# Patient Record
Sex: MALE | Race: WHITE | NOT HISPANIC OR LATINO | Employment: FULL TIME | URBAN - METROPOLITAN AREA
[De-identification: names, ages, dates, MRNs, and addresses within clinical notes are randomized per-mention and may not be internally consistent; named-entity substitution may affect disease eponyms.]

---

## 2018-02-08 ENCOUNTER — TRANSFERRED RECORDS (OUTPATIENT)
Dept: HEALTH INFORMATION MANAGEMENT | Facility: CLINIC | Age: 28
End: 2018-02-08

## 2018-07-13 ENCOUNTER — TRANSFERRED RECORDS (OUTPATIENT)
Dept: HEALTH INFORMATION MANAGEMENT | Facility: CLINIC | Age: 28
End: 2018-07-13

## 2018-08-08 ENCOUNTER — TRANSFERRED RECORDS (OUTPATIENT)
Dept: HEALTH INFORMATION MANAGEMENT | Facility: CLINIC | Age: 28
End: 2018-08-08

## 2019-04-23 ENCOUNTER — TRANSFERRED RECORDS (OUTPATIENT)
Dept: HEALTH INFORMATION MANAGEMENT | Facility: CLINIC | Age: 29
End: 2019-04-23

## 2019-05-22 ENCOUNTER — TRANSFERRED RECORDS (OUTPATIENT)
Dept: HEALTH INFORMATION MANAGEMENT | Facility: CLINIC | Age: 29
End: 2019-05-22

## 2019-07-29 ENCOUNTER — TRANSFERRED RECORDS (OUTPATIENT)
Dept: HEALTH INFORMATION MANAGEMENT | Facility: CLINIC | Age: 29
End: 2019-07-29

## 2019-08-21 ENCOUNTER — TRANSFERRED RECORDS (OUTPATIENT)
Dept: HEALTH INFORMATION MANAGEMENT | Facility: CLINIC | Age: 29
End: 2019-08-21

## 2019-09-09 ENCOUNTER — PATIENT OUTREACH (OUTPATIENT)
Dept: GASTROENTEROLOGY | Facility: CLINIC | Age: 29
End: 2019-09-09

## 2019-09-12 ENCOUNTER — PATIENT OUTREACH (OUTPATIENT)
Dept: GASTROENTEROLOGY | Facility: CLINIC | Age: 29
End: 2019-09-12

## 2019-09-12 NOTE — PROGRESS NOTES
Pt returned call and scheduled with Dr. Kp Aguilar on Ocotober 15 at 340 pm. Pt aware of the date and time to check in. Need records from [place].

## 2019-09-13 NOTE — PROGRESS NOTES
Pt was rescheduled with Dr. Riggs. Pt referred to Dr. Riggs by Dr. Tamika Finn. October 25 at 8 am. Pt in agreement.

## 2019-10-23 ENCOUNTER — TELEPHONE (OUTPATIENT)
Dept: GASTROENTEROLOGY | Facility: CLINIC | Age: 29
End: 2019-10-23

## 2019-10-23 NOTE — TELEPHONE ENCOUNTER
RECORDS RECEIVED FROM: [facility]    DATE RECEIVED: 10/25/19    NOTES STATUS DETAILS   OFFICE NOTE from referring provider Received [facility] recs scanned in [facility]    OFFICE NOTE from other specialist Received    DISCHARGE SUMMARY from hospital N/A    OPERATIVE REPORT N/A    MEDICATION LIST N/A         ENDOSCOPY  N/A    COLONOSCOPY Received  2/8/19   ERCP N/A    EUS N/A    STOOL TESTING N/A    PERTINENT LABS Received Scanned in [facility]    PATHOLOGY REPORTS (RELATED) Received BX slides received from [facility], will send to path Dept - 10/25   IMAGING (CT, MRI, EGD) In process Fax to [facility] to obtain images - 10/23     REFERRAL INFORMATION    Date referral was placed: 10/25/19   Date all records received: N/A   Date records were scanned into Epic: N/A   Date records were sent to Provider to review: N/A   Date and recommendation received from provider:  LETTER SENT  SCHEDULE APPOINTMENT   Date patient was contacted to schedule: 9/12/19

## 2019-10-23 NOTE — TELEPHONE ENCOUNTER
Called and left message for patient reminding of appointment scheduled on 10/25/19 at 0800 with [facility]. Patient to arrive 15 min early. To reschedule or cancel patient to call [phone].    LEXIS Guzman

## 2019-10-25 ENCOUNTER — PRE VISIT (OUTPATIENT)
Dept: GASTROENTEROLOGY | Facility: CLINIC | Age: 29
End: 2019-10-25

## 2019-10-25 ENCOUNTER — OFFICE VISIT (OUTPATIENT)
Dept: GASTROENTEROLOGY | Facility: CLINIC | Age: 29
End: 2019-10-25
Payer: COMMERCIAL

## 2019-10-25 VITALS
SYSTOLIC BLOOD PRESSURE: 145 MMHG | HEIGHT: 74 IN | DIASTOLIC BLOOD PRESSURE: 100 MMHG | TEMPERATURE: 97.9 F | OXYGEN SATURATION: 99 % | HEART RATE: 92 BPM | BODY MASS INDEX: 28.85 KG/M2 | WEIGHT: 224.8 LBS

## 2019-10-25 DIAGNOSIS — K51.211 CHRONIC ULCERATIVE PROCTITIS WITH RECTAL BLEEDING (H): ICD-10-CM

## 2019-10-25 DIAGNOSIS — Z23 NEED FOR PROPHYLACTIC VACCINATION AND INOCULATION AGAINST INFLUENZA: Primary | ICD-10-CM

## 2019-10-25 LAB
ALBUMIN SERPL-MCNC: 4.3 G/DL (ref 3.4–5)
ALP SERPL-CCNC: 84 U/L (ref 40–150)
ALT SERPL W P-5'-P-CCNC: 30 U/L (ref 0–70)
AST SERPL W P-5'-P-CCNC: 25 U/L (ref 0–45)
BASOPHILS # BLD AUTO: 0 10E9/L (ref 0–0.2)
BASOPHILS NFR BLD AUTO: 0.6 %
BILIRUB DIRECT SERPL-MCNC: 0.2 MG/DL (ref 0–0.2)
BILIRUB SERPL-MCNC: 0.5 MG/DL (ref 0.2–1.3)
CRP SERPL-MCNC: 3 MG/L (ref 0–8)
DIFFERENTIAL METHOD BLD: ABNORMAL
EOSINOPHIL # BLD AUTO: 0.1 10E9/L (ref 0–0.7)
EOSINOPHIL NFR BLD AUTO: 0.8 %
ERYTHROCYTE [DISTWIDTH] IN BLOOD BY AUTOMATED COUNT: 17.8 % (ref 10–15)
ERYTHROCYTE [SEDIMENTATION RATE] IN BLOOD BY WESTERGREN METHOD: 9 MM/H (ref 0–15)
FERRITIN SERPL-MCNC: 7 NG/ML (ref 26–388)
HCT VFR BLD AUTO: 43.7 % (ref 40–53)
HGB BLD-MCNC: 12.9 G/DL (ref 13.3–17.7)
IMM GRANULOCYTES # BLD: 0 10E9/L (ref 0–0.4)
IMM GRANULOCYTES NFR BLD: 0.1 %
IRON SATN MFR SERPL: 6 % (ref 15–46)
IRON SERPL-MCNC: 29 UG/DL (ref 35–180)
LYMPHOCYTES # BLD AUTO: 1.6 10E9/L (ref 0.8–5.3)
LYMPHOCYTES NFR BLD AUTO: 22.6 %
MCH RBC QN AUTO: 22.4 PG (ref 26.5–33)
MCHC RBC AUTO-ENTMCNC: 29.5 G/DL (ref 31.5–36.5)
MCV RBC AUTO: 76 FL (ref 78–100)
MONOCYTES # BLD AUTO: 0.7 10E9/L (ref 0–1.3)
MONOCYTES NFR BLD AUTO: 9.2 %
NEUTROPHILS # BLD AUTO: 4.7 10E9/L (ref 1.6–8.3)
NEUTROPHILS NFR BLD AUTO: 66.7 %
NRBC # BLD AUTO: 0 10*3/UL
NRBC BLD AUTO-RTO: 0 /100
PLATELET # BLD AUTO: 244 10E9/L (ref 150–450)
PROT SERPL-MCNC: 8.9 G/DL (ref 6.8–8.8)
RBC # BLD AUTO: 5.75 10E12/L (ref 4.4–5.9)
TIBC SERPL-MCNC: 476 UG/DL (ref 240–430)
WBC # BLD AUTO: 7.1 10E9/L (ref 4–11)

## 2019-10-25 RX ORDER — MESALAMINE 1.2 G/1
2.4 TABLET, DELAYED RELEASE ORAL
COMMUNITY
End: 2020-02-04

## 2019-10-25 RX ORDER — BUDESONIDE 28 MG/1
AEROSOL, FOAM RECTAL
Qty: 4 EACH | Refills: 2 | Status: SHIPPED | OUTPATIENT
Start: 2019-10-25 | End: 2019-12-06

## 2019-10-25 ASSESSMENT — ENCOUNTER SYMPTOMS
SINUS CONGESTION: 0
DEPRESSION: 1
FEVER: 0
ARTHRALGIAS: 1
CONSTIPATION: 0
DECREASED CONCENTRATION: 0
JOINT SWELLING: 0
BOWEL INCONTINENCE: 0
HEARTBURN: 1
NECK MASS: 0
ALTERED TEMPERATURE REGULATION: 0
MYALGIAS: 0
CHILLS: 0
RECTAL PAIN: 0
WEIGHT GAIN: 0
INCREASED ENERGY: 0
HOARSE VOICE: 0
PANIC: 0
JAUNDICE: 0
DIARRHEA: 1
NECK PAIN: 0
TROUBLE SWALLOWING: 0
HALLUCINATIONS: 0
BLOOD IN STOOL: 1
POLYPHAGIA: 0
MUSCLE WEAKNESS: 0
STIFFNESS: 0
SMELL DISTURBANCE: 0
SINUS PAIN: 0
NAUSEA: 0
SORE THROAT: 0
ABDOMINAL PAIN: 1
TASTE DISTURBANCE: 0
BLOATING: 0
VOMITING: 0
NERVOUS/ANXIOUS: 0
INSOMNIA: 1
POLYDIPSIA: 0
FATIGUE: 0
DECREASED APPETITE: 0
WEIGHT LOSS: 0
NIGHT SWEATS: 0
MUSCLE CRAMPS: 0
BACK PAIN: 0

## 2019-10-25 ASSESSMENT — MIFFLIN-ST. JEOR: SCORE: 2054.44

## 2019-10-25 ASSESSMENT — PATIENT HEALTH QUESTIONNAIRE - PHQ9
SUM OF ALL RESPONSES TO PHQ QUESTIONS 1-9: 6
SUM OF ALL RESPONSES TO PHQ QUESTIONS 1-9: 6
10. IF YOU CHECKED OFF ANY PROBLEMS, HOW DIFFICULT HAVE THESE PROBLEMS MADE IT FOR YOU TO DO YOUR WORK, TAKE CARE OF THINGS AT HOME, OR GET ALONG WITH OTHER PEOPLE: NOT DIFFICULT AT ALL

## 2019-10-25 ASSESSMENT — PAIN SCALES - GENERAL: PAINLEVEL: NO PAIN (0)

## 2019-10-25 NOTE — NURSING NOTE
"Chief Complaint   Patient presents with     New Patient     new IBD consult       Vitals:    10/25/19 0758   BP: (!) 145/100   Pulse: 92   Temp: 97.9  F (36.6  C)   TempSrc: Oral   SpO2: 99%   Weight: 102 kg (224 lb 12.8 oz)   Height: 1.88 m (6' 2\")       Body mass index is 28.86 kg/m .    Kristan Riley CMA    "

## 2019-10-25 NOTE — NURSING NOTE
Printed after visit summary given to pt along with follow up appt.  Handouts on ulcerative colitis and entyvio given to pt. Pt sent to the lab. Will check in with pt in 2 weeks.

## 2019-10-25 NOTE — PROGRESS NOTES
IBD CLINIC VISIT     CC/REFERRING MD:  Self   REASON FOR CONSULTATION: Ulcerative colitis    ASSESSMENT/PLAN  29 year old with ulcerative proctitis    1.  Ulcerative proctitis: Patient with proctitis seen in 2018 on colonoscopy and again on flex sig in April 2019.  Currently minimally symptomatic on mesalamine although increase in bleeding is concerning.  Recommend steroid foam for 2 weeks.  If ongoing symptoms will need flex sig to restage disease.  If he had severe proctitis despite mesalamine and rectal steroids would opt for more definitive therapy such as vedolizumab.  Would proceed with vedolizumab over anti-TNF based on VARSITY clinical trial.  -- Steroid foam for 2 weeks, if better wean off and repeat as needed  -- If ongoing symptoms despite steroids, or rapid recurrence, will need flex will sigmoidoscopy to reevaluate disease severity  -- Information given on vedolizumab another ulcerative colitis therapies today  -- Blood work today to monitor anemia and inflammatory markers  -- He is due for a flu shot today (administered in clinic)      IBD HISTORY  Age at diagnosis: 27 (feb 2018)  Extent of disease: Proctitis   Current UC medications:   - Lialda 4.8g per day  Prior UC surgeries: None  Prior IBD Medications:  Prednisone x2 (once was IV steroids in hospital)  Canasa - no benefit  Rowasa - no benefit  Hydrocortisone enema -     DRUG MONITORING  TPMT enzyme activity:     6-TGN/6-MMPN levels:    Biologic concentration:    DISEASE ASSESSMENT  Labs:  No lab results found.    Invalid input(s):  ALB,  HGB  Endoscopic assessment: --  Enterography: --  Fecal calprotectin: --  C diff: --    sIBDQ:  No flowsheet data found.     IBD Health Care Maintenance:    Vaccinations:  All patients on biologics should avoid live vaccines.    -- Influenza (every year)  -- TdaP (every 10 years)  -- Pneumococcal Pneumonia (once plus booster at 5 years)  -- Yearly assessment for latent Tb (verbal screening and exam, PPD or  QuantiFERON-Tb testing)    One time confirmation of immunity or serologies:  -- Hepatitis A (serologies or immunizations)  -- Hepatitis B (serologies or immunizations)  -- Varicella  -- MMR  -- HPV (all aged 18-26)  -- Meningococcal meningitis (all patients at risk for meningitis)    Bone mineral density screening   -- Recommend all patients supplement with calcium and vitamin D    Cancer Screening:  Colon cancer screening:  Recommend colonoscopy in 2026 to assess histologic extent of disease    Skin cancer screening: Annual visual exam of skin by dermatologist since patient is immunocompromised    Depression Screening:  PHQ-2 Score:     PHQ-2 ( 1999 Pfizer) 10/25/2019   Q1: Little interest or pleasure in doing things 1   Q2: Feeling down, depressed or hopeless 2   PHQ-2 Score 3   Q1: Little interest or pleasure in doing things Several days   Q2: Feeling down, depressed or hopeless More than half the days   PHQ-2 Score 3      Misc:  -- Avoid tobacco use  -- Avoid NSAIDs as there is potentially a 25% chance of causing an IBD flare    Return to clinic in 3 months    Thank you for this consultation.  It was a pleasure to participate in the care of this patient; please contact us with any further questions.     This note was created with voice recognition software, and while reviewed for accuracy, typos may remain.     Charles Riggs MD   of Medicine  Inflammatory Bowel Disease Program   Division of Gastroenterology, Hepatology and Nutrition  [facility]          HPI:   Is a 29-year-old male who comes in today to establish care at the plantar bowel disease program at the [facility].  He was initially diagnosed with ulcerative proctitis in 2018.  He was given Canasa and hydrocortisone enemas, he does not think either of those helped.  He has had at least 2 steroid courses including one hospitalization with IV steroids.  He was then underwent a flex will sigmoidoscopy in April 2019  "and restarted on Canasa and Rowasa.  He reports taking both knees at the same time it was very uncomfortable and caused a lot of pain.  He does not feel like it helped with his symptoms.  He was put on Lialda 4.8 g a day and does feel that that helped somewhat.  He has had a pretty good summer.  However he is having an increase in his rectal bleeding over the past few weeks.  He notes that his diet has been worse with football season, he is eating more greasy foods.  He is also under a little more stress.    Currently, having some increase in rectal bleeding.  Having 2-3 stools a day (baseline). Stools are formed. Seeing blood on outside and in toilet.      Reports diet has not been good - over eating sugary foods and candy.     Lee score:   Stool freq: 0 (baseline stools frequency)  Rectal bleedin (Visible blood more than half of the time)  PGA: 1 (Mild)  Endoscopy: Not done    ROS:    No fevers or chills  No weight loss  No blurry vision, double vision or change in vision  No sore throat  No lymphadenopathy  No headache, paraesthesias, or weakness in a limb  No shortness of breath or wheezing  No chest pain or pressure  No arthralgias or myalgias  No rashes or skin changes  No odynophagia or dysphagia  No BRBPR, hematochezia, melena  No dysuria, frequency or urgency  No hot/cold intolerance or polyria  No anxiety or depression    Extra intestinal manifestations of IBD:  No uveitis/episcleritis  No aphthous ulcers   No arthritis   No erythema nodosum/pyoderma gangrenosum.     PERTINENT PAST MEDICAL HISTORY:  No past medical history on file.    PREVIOUS SURGERIES:  No past surgical history on file.    PREVIOUS ENDOSCOPY:  Colonoscopy 2018: Normal ileum. Normal colon above rectum. \"Moderaly erythematous, friable, ulcerated mucosa in the rectum - 3-4cm from anal verge\"  Path: Moderately active acute and chronic colitis.     Flex si2019: proctitis: \"Congested, erythematous, hemorraghic, inflamed and " "ulcerated mucosa in the rectum\"  Path: Active chronic colitis consistent with IBD    ALLERGIES:   No Known Allergies    PERTINENT MEDICATIONS:    Current Outpatient Medications:      mesalamine (LIALDA) 1.2 g EC tablet, Take 2.4 g by mouth, Disp: , Rfl:     SOCIAL HISTORY:  Social History     Socioeconomic History     Marital status:      Spouse name: Not on file     Number of children: Not on file     Years of education: Not on file     Highest education level: Not on file   Occupational History     Not on file   Social Needs     Financial resource strain: Not on file     Food insecurity:     Worry: Not on file     Inability: Not on file     Transportation needs:     Medical: Not on file     Non-medical: Not on file   Tobacco Use     Smoking status: Light Tobacco Smoker     Packs/day: 0.00     Smokeless tobacco: Never Used   Substance and Sexual Activity     Alcohol use: Not on file     Drug use: Not on file     Sexual activity: Not on file   Lifestyle     Physical activity:     Days per week: Not on file     Minutes per session: Not on file     Stress: Not on file   Relationships     Social connections:     Talks on phone: Not on file     Gets together: Not on file     Attends Congregational service: Not on file     Active member of club or organization: Not on file     Attends meetings of clubs or organizations: Not on file     Relationship status: Not on file     Intimate partner violence:     Fear of current or ex partner: Not on file     Emotionally abused: Not on file     Physically abused: Not on file     Forced sexual activity: Not on file   Other Topics Concern     Not on file   Social History Narrative     Not on file       FAMILY HISTORY:  No family history on file.    Past/family/social history reviewed and no changes    PHYSICAL EXAMINATION:  Constitutional: aaox3, cooperative, pleasant, not dyspneic/diaphoretic, no acute distress  Vitals reviewed: BP (!) 145/100   Pulse 92   Temp 97.9  F (36.6  C) " "(Oral)   Ht 1.88 m (6' 2\")   Wt 102 kg (224 lb 12.8 oz)   SpO2 99%   BMI 28.86 kg/m    Wt:   Wt Readings from Last 2 Encounters:   10/25/19 102 kg (224 lb 12.8 oz)      Eyes: Sclera anicteric/injected  Ears/nose/mouth/throat: Normal oropharynx without ulcers or exudate, mucus membranes moist, hearing intact  Neck: supple, thyroid normal size  CV: No edema  Respiratory: Unlabored breathing  Lymph: No axillary, submandibular, supraclavicular or inguinal lymphadenopathy  Abd: Nondistended, +bs, no hepatosplenomegaly, nontender, no peritoneal signs  Skin: warm, perfused, no jaundice  Psych: Normal affect  MSK: Normal gait      PERTINENT STUDIES:  Most recent CBC:  No lab results found.  Most recent hepatic panel:  No lab results found.    Invalid input(s): HERMELINDA, ALP  Most recent creatinine:  No lab results found.       Answers for HPI/ROS submitted by the patient on 10/25/2019   If you checked off any problems, how difficult have these problems made it for you to do your work, take care of things at home, or get along with other people?: Not difficult at all  PHQ9 TOTAL SCORE: 6  General Symptoms: Yes  Skin Symptoms: No  HENT Symptoms: Yes  EYE SYMPTOMS: No  HEART SYMPTOMS: No  LUNG SYMPTOMS: No  INTESTINAL SYMPTOMS: Yes  URINARY SYMPTOMS: No  REPRODUCTIVE SYMPTOMS: No  SKELETAL SYMPTOMS: Yes  BLOOD SYMPTOMS: No  NERVOUS SYSTEM SYMPTOMS: No  MENTAL HEALTH SYMPTOMS: Yes  Fever: No  Loss of appetite: No  Weight loss: No  Weight gain: No  Fatigue: No  Night sweats: No  Chills: No  Increased stress: Yes  Excessive hunger: No  Excessive thirst: No  Feeling hot or cold when others believe the temperature is normal: No  Loss of height: No  Post-operative complications: No  Surgical site pain: No  Hallucinations: No  Change in or Loss of Energy: No  Hyperactivity: No  Confusion: No  Ear pain: No  Ear discharge: No  Hearing loss: No  Tinnitus: No  Nosebleeds: No  Congestion: No  Sinus pain: No  Trouble swallowing: No   Voice " hoarseness: No  Mouth sores: No  Sore throat: No  Tooth pain: No  Gum tenderness: No  Bleeding gums: No  Change in taste: No  Change in sense of smell: No  Dry mouth: No  Hearing aid used: No  Neck lump: No  Heart burn or indigestion: Yes  Nausea: No  Vomiting: No  Abdominal pain: Yes  Bloating: No  Constipation: No  Diarrhea: Yes  Blood in stool: Yes  Black stools: No  Rectal or Anal pain: No  Fecal incontinence: No  Yellowing of skin or eyes: No  Vomit with blood: No  Change in stools: Yes  Back pain: No  Muscle aches: No  Neck pain: No  Swollen joints: No  Joint pain: Yes  Bone pain: No  Muscle cramps: No  Muscle weakness: No  Joint stiffness: No  Bone fracture: No  Nervous or Anxious: No  Depression: Yes  Trouble sleeping: Yes  Trouble thinking or concentrating: No  Mood changes: Yes  Panic attacks: No

## 2019-10-25 NOTE — LETTER
10/25/2019       RE: Jose Alejandro Valentine  [address]  [place] [address]     Dear Colleague,    Thank you for referring your patient, Jose Alejandro Valentine, to the [facility] GASTROENTEROLOGY AND IBD CLINIC at [facility]. Please see a copy of my visit note below.    IBD CLINIC VISIT     CC/REFERRING MD:  Self   REASON FOR CONSULTATION: Ulcerative colitis    ASSESSMENT/PLAN  29 year old with ulcerative proctitis    1.  Ulcerative proctitis: Patient with proctitis seen in 2018 on colonoscopy and again on flex sig in April 2019.  Currently minimally symptomatic on mesalamine although increase in bleeding is concerning.  Recommend steroid foam for 2 weeks.  If ongoing symptoms will need flex sig to restage disease.  If he had severe proctitis despite mesalamine and rectal steroids would opt for more definitive therapy such as vedolizumab.  Would proceed with vedolizumab over anti-TNF based on VARSITY clinical trial.  -- Steroid foam for 2 weeks, if better wean off and repeat as needed  -- If ongoing symptoms despite steroids, or rapid recurrence, will need flex will sigmoidoscopy to reevaluate disease severity  -- Information given on vedolizumab another ulcerative colitis therapies today  -- Blood work today to monitor anemia and inflammatory markers  -- He is due for a flu shot today (administered in clinic)    IBD HISTORY  Age at diagnosis: 27 (feb 2018)  Extent of disease: Proctitis   Current UC medications:   - Lialda 4.8g per day  Prior UC surgeries: None  Prior IBD Medications:  Prednisone x2 (once was IV steroids in hospital)  Canasa - no benefit  Rowasa - no benefit  Hydrocortisone enema -     DRUG MONITORING  TPMT enzyme activity:     6-TGN/6-MMPN levels:    Biologic concentration:    DISEASE ASSESSMENT  Labs:  No lab results found.    Invalid input(s):  ALB,  HGB  Endoscopic assessment: --  Enterography: --  Fecal calprotectin: --  C diff: --    sIBDQ:  No flowsheet data found.      IBD Health Care Maintenance:    Vaccinations:  All patients on biologics should avoid live vaccines.    -- Influenza (every year)  -- TdaP (every 10 years)  -- Pneumococcal Pneumonia (once plus booster at 5 years)  -- Yearly assessment for latent Tb (verbal screening and exam, PPD or QuantiFERON-Tb testing)    One time confirmation of immunity or serologies:  -- Hepatitis A (serologies or immunizations)  -- Hepatitis B (serologies or immunizations)  -- Varicella  -- MMR  -- HPV (all aged 18-26)  -- Meningococcal meningitis (all patients at risk for meningitis)    Bone mineral density screening   -- Recommend all patients supplement with calcium and vitamin D    Cancer Screening:  Colon cancer screening:  Recommend colonoscopy in 2026 to assess histologic extent of disease    Skin cancer screening: Annual visual exam of skin by dermatologist since patient is immunocompromised    Depression Screening:  PHQ-2 Score:     PHQ-2 ( 1999 Pfizer) 10/25/2019   Q1: Little interest or pleasure in doing things 1   Q2: Feeling down, depressed or hopeless 2   PHQ-2 Score 3   Q1: Little interest or pleasure in doing things Several days   Q2: Feeling down, depressed or hopeless More than half the days   PHQ-2 Score 3      Misc:  -- Avoid tobacco use  -- Avoid NSAIDs as there is potentially a 25% chance of causing an IBD flare    Return to clinic in 3 months    Thank you for this consultation.  It was a pleasure to participate in the care of this patient; please contact us with any further questions.     This note was created with voice recognition software, and while reviewed for accuracy, typos may remain.     Charles Riggs MD   of Medicine  Inflammatory Bowel Disease Program   Division of Gastroenterology, Hepatology and Nutrition  [facility]          HPI:   Is a 29-year-old male who comes in today to establish care at the plantar bowel disease program at the [facility].  He was  initially diagnosed with ulcerative proctitis in 2018.  He was given Canasa and hydrocortisone enemas, he does not think either of those helped.  He has had at least 2 steroid courses including one hospitalization with IV steroids.  He was then underwent a flex will sigmoidoscopy in 2019 and restarted on Canasa and Rowasa.  He reports taking both knees at the same time it was very uncomfortable and caused a lot of pain.  He does not feel like it helped with his symptoms.  He was put on Lialda 4.8 g a day and does feel that that helped somewhat.  He has had a pretty good summer.  However he is having an increase in his rectal bleeding over the past few weeks.  He notes that his diet has been worse with football season, he is eating more greasy foods.  He is also under a little more stress.    Currently, having some increase in rectal bleeding.  Having 2-3 stools a day (baseline). Stools are formed. Seeing blood on outside and in toilet.      Reports diet has not been good - over eating sugary foods and candy.     Lee score:   Stool freq: 0 (baseline stools frequency)  Rectal bleedin (Visible blood more than half of the time)  PGA: 1 (Mild)  Endoscopy: Not done    ROS:    No fevers or chills  No weight loss  No blurry vision, double vision or change in vision  No sore throat  No lymphadenopathy  No headache, paraesthesias, or weakness in a limb  No shortness of breath or wheezing  No chest pain or pressure  No arthralgias or myalgias  No rashes or skin changes  No odynophagia or dysphagia  No BRBPR, hematochezia, melena  No dysuria, frequency or urgency  No hot/cold intolerance or polyria  No anxiety or depression    Extra intestinal manifestations of IBD:  No uveitis/episcleritis  No aphthous ulcers   No arthritis   No erythema nodosum/pyoderma gangrenosum.     PERTINENT PAST MEDICAL HISTORY:  No past medical history on file.    PREVIOUS SURGERIES:  No past surgical history on file.    PREVIOUS  "ENDOSCOPY:  Colonoscopy 2018: Normal ileum. Normal colon above rectum. \"Moderaly erythematous, friable, ulcerated mucosa in the rectum - 3-4cm from anal verge\"  Path: Moderately active acute and chronic colitis.     Flex si2019: proctitis: \"Congested, erythematous, hemorraghic, inflamed and ulcerated mucosa in the rectum\"  Path: Active chronic colitis consistent with IBD    ALLERGIES:   No Known Allergies    PERTINENT MEDICATIONS:    Current Outpatient Medications:      mesalamine (LIALDA) 1.2 g EC tablet, Take 2.4 g by mouth, Disp: , Rfl:     SOCIAL HISTORY:  Social History     Socioeconomic History     Marital status:      Spouse name: Not on file     Number of children: Not on file     Years of education: Not on file     Highest education level: Not on file   Occupational History     Not on file   Social Needs     Financial resource strain: Not on file     Food insecurity:     Worry: Not on file     Inability: Not on file     Transportation needs:     Medical: Not on file     Non-medical: Not on file   Tobacco Use     Smoking status: Light Tobacco Smoker     Packs/day: 0.00     Smokeless tobacco: Never Used   Substance and Sexual Activity     Alcohol use: Not on file     Drug use: Not on file     Sexual activity: Not on file   Lifestyle     Physical activity:     Days per week: Not on file     Minutes per session: Not on file     Stress: Not on file   Relationships     Social connections:     Talks on phone: Not on file     Gets together: Not on file     Attends Alevism service: Not on file     Active member of club or organization: Not on file     Attends meetings of clubs or organizations: Not on file     Relationship status: Not on file     Intimate partner violence:     Fear of current or ex partner: Not on file     Emotionally abused: Not on file     Physically abused: Not on file     Forced sexual activity: Not on file   Other Topics Concern     Not on file   Social History Narrative " "    Not on file       FAMILY HISTORY:  No family history on file.    Past/family/social history reviewed and no changes    PHYSICAL EXAMINATION:  Constitutional: aaox3, cooperative, pleasant, not dyspneic/diaphoretic, no acute distress  Vitals reviewed: BP (!) 145/100   Pulse 92   Temp 97.9  F (36.6  C) (Oral)   Ht 1.88 m (6' 2\")   Wt 102 kg (224 lb 12.8 oz)   SpO2 99%   BMI 28.86 kg/m     Wt:   Wt Readings from Last 2 Encounters:   10/25/19 102 kg (224 lb 12.8 oz)      Eyes: Sclera anicteric/injected  Ears/nose/mouth/throat: Normal oropharynx without ulcers or exudate, mucus membranes moist, hearing intact  Neck: supple, thyroid normal size  CV: No edema  Respiratory: Unlabored breathing  Lymph: No axillary, submandibular, supraclavicular or inguinal lymphadenopathy  Abd: Nondistended, +bs, no hepatosplenomegaly, nontender, no peritoneal signs  Skin: warm, perfused, no jaundice  Psych: Normal affect  MSK: Normal gait      PERTINENT STUDIES:  Most recent CBC:  No lab results found.  Most recent hepatic panel:  No lab results found.    Invalid input(s): HERMELINDA, ALP  Most recent creatinine:  No lab results found.    Again, thank you for allowing me to participate in the care of your patient.      Sincerely,    Charles Riggs MD      "

## 2019-10-28 PROCEDURE — 00000346 ZZHCL STATISTIC REVIEW OUTSIDE SLIDES TC 88321: Performed by: INTERNAL MEDICINE

## 2019-10-29 ENCOUNTER — HEALTH MAINTENANCE LETTER (OUTPATIENT)
Age: 29
End: 2019-10-29

## 2019-10-30 LAB — COPATH REPORT: NORMAL

## 2019-11-07 ENCOUNTER — PATIENT OUTREACH (OUTPATIENT)
Dept: GASTROENTEROLOGY | Facility: CLINIC | Age: 29
End: 2019-11-07

## 2019-11-07 NOTE — PROGRESS NOTES
Called pt to check on his symptoms  Left my name and number.   If not any better would consider flex sig.

## 2020-01-31 ENCOUNTER — TELEPHONE (OUTPATIENT)
Dept: GASTROENTEROLOGY | Facility: CLINIC | Age: 30
End: 2020-01-31

## 2020-01-31 NOTE — TELEPHONE ENCOUNTER
Spoke to patient reminding of appointment scheduled on 2/4/20 at 0800 with [place] [facility]. Patient to arrive 15 min early. To reschedule or cancel patient to call [phone].    LEXIS Guzman

## 2020-02-04 ENCOUNTER — OFFICE VISIT (OUTPATIENT)
Dept: GASTROENTEROLOGY | Facility: CLINIC | Age: 30
End: 2020-02-04
Payer: COMMERCIAL

## 2020-02-04 VITALS
HEIGHT: 74 IN | BODY MASS INDEX: 29.44 KG/M2 | OXYGEN SATURATION: 99 % | DIASTOLIC BLOOD PRESSURE: 88 MMHG | HEART RATE: 95 BPM | TEMPERATURE: 98.4 F | SYSTOLIC BLOOD PRESSURE: 135 MMHG | WEIGHT: 229.4 LBS

## 2020-02-04 DIAGNOSIS — K51.211 CHRONIC ULCERATIVE PROCTITIS WITH RECTAL BLEEDING (H): Primary | ICD-10-CM

## 2020-02-04 RX ORDER — MESALAMINE 1.2 G/1
4.8 TABLET, DELAYED RELEASE ORAL DAILY
Qty: 360 TABLET | Refills: 3 | Status: SHIPPED | OUTPATIENT
Start: 2020-02-04 | End: 2021-02-03

## 2020-02-04 RX ORDER — MULTIPLE VITAMINS W/ MINERALS TAB 9MG-400MCG
1 TAB ORAL DAILY
Status: ON HOLD | COMMUNITY
End: 2023-07-30

## 2020-02-04 ASSESSMENT — MIFFLIN-ST. JEOR: SCORE: 2070.3

## 2020-02-04 ASSESSMENT — PAIN SCALES - GENERAL: PAINLEVEL: NO PAIN (0)

## 2020-02-04 NOTE — NURSING NOTE
Printed after visit summary given to pt along with follow up appt. Will have creatinine drawn one month after starting Lialda.

## 2020-02-04 NOTE — PATIENT INSTRUCTIONS
-- Re-start Lialda 4.8g per day        -- Start iron supplementation: 325mg of iron once a day (or 65mg elemental iron)       Thanks Dania Solano RN Care Coordinator for Dr. Riggs  Phone   [phone]       For questions regarding your care Monday through Friday, contact the RN GI care coordinator,  Call   [phone] . Your call will be  returned same day, or if consultation is needed with the provider, it may be following business day - or you may send a My Chart message.    For medication refills (prescribed by the GI clinic), contact your pharmacy.    For appointment rescheduling/cancellation, contact [phone]     After hours, or if you have an immediate GI concern and cannot wait for a return call, contact the GI Fellow at [phone] and select option #4.

## 2020-02-04 NOTE — LETTER
2/4/2020       RE: Jose Alejandro Valentine  [address]  [place] [address]     Dear Colleague,    Thank you for referring your patient, Jose Alejandro Valentine, to the [facility] GASTROENTEROLOGY [facility] IBD [facility]INIC at [facility]. Please see a copy of my visit note below.    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  Ulcerative proctitis: Patient with a mild flare based on partial Lee score in the setting of stopping Lialda as he ran refills.  When he was on Lialda he was in clinical remission.  Plan to restart Lialda at this time.  -- Re-start Lialda 4.8g per day  -- If not getting better on Lialda after 4 weeks, will need flex sig.     2. Anemia, iron deficient.   -- Iron 325mg daily (or every other day) for 6 months  -- Repeat iron labs over summer    3. Elevated creatinine: Noted in July 2019. Plan to repeat after 1 month on Lialda.   -- Creatinine in 1 month.     IBD HISTORY  Age at diagnosis: 27 (feb 2018)  Extent of disease: Proctitis   Current UC medications:   - Lialda 4.8g per day  Prior UC surgeries: None  Prior IBD Medications:  Prednisone x2 (once was IV steroids in hospital)  Canasa - no benefit  Rowasa - no benefit  Hydrocortisone enema     DRUG MONITORING  TPMT enzyme activity:     6-TGN/6-MMPN levels:    Biologic concentration:    DISEASE ASSESSMENT  Labs:  Recent Labs   Lab Test 10/25/19  0907   CRP 3.0   SED 9     Endoscopic assessment: --  Enterography: --  Fecal calprotectin: --  C diff: --    sIBDQ:  No flowsheet data found.     IBD Health Care Maintenance:    Vaccinations:  All patients on biologics should avoid live vaccines.    -- Influenza (every year)  -- TdaP (every 10 years)  -- Pneumococcal Pneumonia (once plus booster at 5 years)  -- Yearly assessment for latent Tb (verbal screening and exam, PPD or QuantiFERON-Tb testing)    One time confirmation of  immunity or serologies:  -- Hepatitis A (serologies or immunizations)  -- Hepatitis B (serologies or immunizations)  -- Varicella  -- MMR  -- HPV (all aged 18-26)  -- Meningococcal meningitis (all patients at risk for meningitis)    Bone mineral density screening   -- Recommend all patients supplement with calcium and vitamin D    Cancer Screening:  Colon cancer screening:  Recommend colonoscopy in 2026 to assess histologic extent of disease    Skin cancer screening: Annual visual exam of skin by dermatologist since patient is immunocompromised    Depression Screening:  PHQ-2 Score:     PHQ-2 ( 1999 [employer]) 10/25/2019   Q1: Little interest or pleasure in doing things 1   Q2: Feeling down, depressed or hopeless 2   PHQ-2 Score 3   Q1: Little interest or pleasure in doing things Several days   Q2: Feeling down, depressed or hopeless More than half the days   PHQ-2 Score 3      Misc:  -- Avoid tobacco use  -- Avoid NSAIDs as there is potentially a 25% chance of causing an IBD flare    Return to clinic in 3 months    Thank you for this consultation.  It was a pleasure to participate in the care of this patient; please contact us with any further questions.     This note was created with voice recognition software, and while reviewed for accuracy, typos may remain.     Charles Riggs MD   of Medicine  Inflammatory Bowel Disease Program   Division of Gastroenterology, Hepatology and Nutrition  [facility]      HPI:   Here for follow-up.  Overall he is doing quite well on Lialda monotherapy.  When on Lialda - normal stool pattern. 1-3 x a day (baseline), formed, no blood.     However he has been off Lialda for about 1 month as he ran in refills.  He did not contact the clinic because he knew he had an appointment coming up.  Initially did well but then started having some recurrence of the symptoms.      Currently having increase in bloating and gas.  Having up to 5-6 stools a day with more  "urgency. Has had recurrence of small amount of blood.      Lee score:   Stool freq: 2 (3-4 stools/day more than normal)  Rectal bleedin (Visible blood less than half of the time)  PGA: 1 (Mild)  Endoscopy: Not done    ROS:    No fevers or chills  No weight loss  No blurry vision, double vision or change in vision  No sore throat  No lymphadenopathy  No headache, paraesthesias, or weakness in a limb  No shortness of breath or wheezing  No chest pain or pressure  No arthralgias or myalgias  No rashes or skin changes  No odynophagia or dysphagia  No BRBPR, hematochezia, melena  No dysuria, frequency or urgency  No hot/cold intolerance or polyria  No anxiety or depression    Extra intestinal manifestations of IBD:  No uveitis/episcleritis  No aphthous ulcers   No arthritis   No erythema nodosum/pyoderma gangrenosum.     PERTINENT PAST MEDICAL HISTORY:  Past Medical History:   Diagnosis Date     Ulcerative proctitis (H)        PREVIOUS SURGERIES:  No past surgical history on file.    PREVIOUS ENDOSCOPY:  Colonoscopy 2018: Normal ileum. Normal colon above rectum. \"Moderaly erythematous, friable, ulcerated mucosa in the rectum - 3-4cm from anal verge\"  Path: Moderately active acute and chronic colitis.     Flex si2019: proctitis: \"Congested, erythematous, hemorraghic, inflamed and ulcerated mucosa in the rectum\"  Path: Active chronic colitis consistent with IBD    ALLERGIES:   No Known Allergies    PERTINENT MEDICATIONS:    Current Outpatient Medications:      multivitamin w/minerals (MULTI-VITAMIN) tablet, Take 1 tablet by mouth daily, Disp: , Rfl:      mesalamine (LIALDA) 1.2 g EC tablet, Take 2.4 g by mouth, Disp: , Rfl:     SOCIAL HISTORY:  Social History     Socioeconomic History     Marital status:      Spouse name: Not on file     Number of children: Not on file     Years of education: Not on file     Highest education level: Not on file   Occupational History     Not on file   Social Needs " "    Financial resource strain: Not on file     Food insecurity:     Worry: Not on file     Inability: Not on file     Transportation needs:     Medical: Not on file     Non-medical: Not on file   Tobacco Use     Smoking status: Former Smoker     Packs/day: 0.00     Smokeless tobacco: Never Used     Tobacco comment: Quit 1/1/2020   Substance and Sexual Activity     Alcohol use: Not on file     Drug use: Not on file     Sexual activity: Not on file   Lifestyle     Physical activity:     Days per week: Not on file     Minutes per session: Not on file     Stress: Not on file   Relationships     Social connections:     Talks on phone: Not on file     Gets together: Not on file     Attends Hindu service: Not on file     Active member of club or organization: Not on file     Attends meetings of clubs or organizations: Not on file     Relationship status: Not on file     Intimate partner violence:     Fear of current or ex partner: Not on file     Emotionally abused: Not on file     Physically abused: Not on file     Forced sexual activity: Not on file   Other Topics Concern     Not on file   Social History Narrative     Not on file       FAMILY HISTORY:  No family history on file.    Past/family/social history reviewed and no changes    PHYSICAL EXAMINATION:  Constitutional: aaox3, cooperative, pleasant, not dyspneic/diaphoretic, no acute distress  Vitals reviewed: /88   Pulse 95   Temp 98.4  F (36.9  C) (Oral)   Ht 1.88 m (6' 2\")   Wt 104.1 kg (229 lb 6.4 oz)   SpO2 99%   BMI 29.45 kg/m     Wt:   Wt Readings from Last 2 Encounters:   02/04/20 104.1 kg (229 lb 6.4 oz)   10/25/19 102 kg (224 lb 12.8 oz)      Eyes: Sclera anicteric/injected  Ears/nose/mouth/throat: Normal oropharynx without ulcers or exudate, mucus membranes moist, hearing intact  Neck: supple, thyroid normal size  CV: No edema  Respiratory: Unlabored breathing  Lymph: No axillary, submandibular, supraclavicular or inguinal " lymphadenopathy  Abd: Nondistended, +bs, no hepatosplenomegaly, nontender, no peritoneal signs  Skin: warm, perfused, no jaundice  Psych: Normal affect  MSK: Normal gait      PERTINENT STUDIES:  Most recent CBC:  Recent Labs   Lab Test 10/25/19  0907   WBC 7.1   HGB 12.9*   HCT 43.7        Most recent hepatic panel:  Recent Labs   Lab Test 10/25/19  0907   ALT 30   AST 25     Most recent creatinine:  No lab results found.    Again, thank you for allowing me to participate in the care of your patient.      Sincerely,    Charles Riggs MD

## 2020-02-04 NOTE — NURSING NOTE
"Chief Complaint   Patient presents with     RECHECK     follow up IBD       Vitals:    02/04/20 0737   BP: 135/88   Pulse: 95   Temp: 98.4  F (36.9  C)   TempSrc: Oral   SpO2: 99%   Weight: 104.1 kg (229 lb 6.4 oz)   Height: 1.88 m (6' 2\")       Body mass index is 29.45 kg/m .    Kristan Riley CMA    "

## 2020-02-04 NOTE — PROGRESS NOTES
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  Ulcerative proctitis: Patient with a mild flare based on partial Lee score in the setting of stopping Lialda as he ran refills.  When he was on Lialda he was in clinical remission.  Plan to restart Lialda at this time.  -- Re-start Lialda 4.8g per day  -- If not getting better on Lialda after 4 weeks, will need flex sig.     2. Anemia, iron deficient.   -- Iron 325mg daily (or every other day) for 6 months  -- Repeat iron labs over summer    3. Elevated creatinine: Noted in July 2019. Plan to repeat after 1 month on Lialda.   -- Creatinine in 1 month.     IBD HISTORY  Age at diagnosis: 27 (feb 2018)  Extent of disease: Proctitis   Current UC medications:   - Lialda 4.8g per day  Prior UC surgeries: None  Prior IBD Medications:  Prednisone x2 (once was IV steroids in hospital)  Canasa - no benefit  Rowasa - no benefit  Hydrocortisone enema     DRUG MONITORING  TPMT enzyme activity:     6-TGN/6-MMPN levels:    Biologic concentration:    DISEASE ASSESSMENT  Labs:  Recent Labs   Lab Test 10/25/19  0907   CRP 3.0   SED 9     Endoscopic assessment: --  Enterography: --  Fecal calprotectin: --  C diff: --    sIBDQ:  No flowsheet data found.     IBD Health Care Maintenance:    Vaccinations:  All patients on biologics should avoid live vaccines.    -- Influenza (every year)  -- TdaP (every 10 years)  -- Pneumococcal Pneumonia (once plus booster at 5 years)  -- Yearly assessment for latent Tb (verbal screening and exam, PPD or QuantiFERON-Tb testing)    One time confirmation of immunity or serologies:  -- Hepatitis A (serologies or immunizations)  -- Hepatitis B (serologies or immunizations)  -- Varicella  -- MMR  -- HPV (all aged 18-26)  -- Meningococcal meningitis (all patients at risk for meningitis)    Bone mineral density screening   -- Recommend all patients  supplement with calcium and vitamin D    Cancer Screening:  Colon cancer screening:  Recommend colonoscopy in  to assess histologic extent of disease    Skin cancer screening: Annual visual exam of skin by dermatologist since patient is immunocompromised    Depression Screening:  PHQ-2 Score:     PHQ-2 (  [employer]) 10/25/2019   Q1: Little interest or pleasure in doing things 1   Q2: Feeling down, depressed or hopeless 2   PHQ-2 Score 3   Q1: Little interest or pleasure in doing things Several days   Q2: Feeling down, depressed or hopeless More than half the days   PHQ-2 Score 3      Misc:  -- Avoid tobacco use  -- Avoid NSAIDs as there is potentially a 25% chance of causing an IBD flare    Return to clinic in 3 months    Thank you for this consultation.  It was a pleasure to participate in the care of this patient; please contact us with any further questions.     This note was created with voice recognition software, and while reviewed for accuracy, typos may remain.     Charles Riggs MD   of Medicine  Inflammatory Bowel Disease Program   Division of Gastroenterology, Hepatology and Nutrition  [facility]      HPI:   Here for follow-up.  Overall he is doing quite well on Lialda monotherapy.  When on Lialda - normal stool pattern. 1-3 x a day (baseline), formed, no blood.     However he has been off Lialda for about 1 month as he ran in refills.  He did not contact the clinic because he knew he had an appointment coming up.  Initially did well but then started having some recurrence of the symptoms.      Currently having increase in bloating and gas.  Having up to 5-6 stools a day with more urgency. Has had recurrence of small amount of blood.      Lee score:   Stool freq: 2 (3-4 stools/day more than normal)  Rectal bleedin (Visible blood less than half of the time)  PGA: 1 (Mild)  Endoscopy: Not done    ROS:    No fevers or chills  No weight loss  No blurry vision, double  "vision or change in vision  No sore throat  No lymphadenopathy  No headache, paraesthesias, or weakness in a limb  No shortness of breath or wheezing  No chest pain or pressure  No arthralgias or myalgias  No rashes or skin changes  No odynophagia or dysphagia  No BRBPR, hematochezia, melena  No dysuria, frequency or urgency  No hot/cold intolerance or polyria  No anxiety or depression    Extra intestinal manifestations of IBD:  No uveitis/episcleritis  No aphthous ulcers   No arthritis   No erythema nodosum/pyoderma gangrenosum.     PERTINENT PAST MEDICAL HISTORY:  Past Medical History:   Diagnosis Date     Ulcerative proctitis (H)        PREVIOUS SURGERIES:  No past surgical history on file.    PREVIOUS ENDOSCOPY:  Colonoscopy 2018: Normal ileum. Normal colon above rectum. \"Moderaly erythematous, friable, ulcerated mucosa in the rectum - 3-4cm from anal verge\"  Path: Moderately active acute and chronic colitis.     Flex si2019: proctitis: \"Congested, erythematous, hemorraghic, inflamed and ulcerated mucosa in the rectum\"  Path: Active chronic colitis consistent with IBD    ALLERGIES:   No Known Allergies    PERTINENT MEDICATIONS:    Current Outpatient Medications:      multivitamin w/minerals (MULTI-VITAMIN) tablet, Take 1 tablet by mouth daily, Disp: , Rfl:      mesalamine (LIALDA) 1.2 g EC tablet, Take 2.4 g by mouth, Disp: , Rfl:     SOCIAL HISTORY:  Social History     Socioeconomic History     Marital status:      Spouse name: Not on file     Number of children: Not on file     Years of education: Not on file     Highest education level: Not on file   Occupational History     Not on file   Social Needs     Financial resource strain: Not on file     Food insecurity:     Worry: Not on file     Inability: Not on file     Transportation needs:     Medical: Not on file     Non-medical: Not on file   Tobacco Use     Smoking status: Former Smoker     Packs/day: 0.00     Smokeless tobacco: Never " "Used     Tobacco comment: Quit 1/1/2020   Substance and Sexual Activity     Alcohol use: Not on file     Drug use: Not on file     Sexual activity: Not on file   Lifestyle     Physical activity:     Days per week: Not on file     Minutes per session: Not on file     Stress: Not on file   Relationships     Social connections:     Talks on phone: Not on file     Gets together: Not on file     Attends Latter-day service: Not on file     Active member of club or organization: Not on file     Attends meetings of clubs or organizations: Not on file     Relationship status: Not on file     Intimate partner violence:     Fear of current or ex partner: Not on file     Emotionally abused: Not on file     Physically abused: Not on file     Forced sexual activity: Not on file   Other Topics Concern     Not on file   Social History Narrative     Not on file       FAMILY HISTORY:  No family history on file.    Past/family/social history reviewed and no changes    PHYSICAL EXAMINATION:  Constitutional: aaox3, cooperative, pleasant, not dyspneic/diaphoretic, no acute distress  Vitals reviewed: /88   Pulse 95   Temp 98.4  F (36.9  C) (Oral)   Ht 1.88 m (6' 2\")   Wt 104.1 kg (229 lb 6.4 oz)   SpO2 99%   BMI 29.45 kg/m    Wt:   Wt Readings from Last 2 Encounters:   02/04/20 104.1 kg (229 lb 6.4 oz)   10/25/19 102 kg (224 lb 12.8 oz)      Eyes: Sclera anicteric/injected  Ears/nose/mouth/throat: Normal oropharynx without ulcers or exudate, mucus membranes moist, hearing intact  Neck: supple, thyroid normal size  CV: No edema  Respiratory: Unlabored breathing  Lymph: No axillary, submandibular, supraclavicular or inguinal lymphadenopathy  Abd: Nondistended, +bs, no hepatosplenomegaly, nontender, no peritoneal signs  Skin: warm, perfused, no jaundice  Psych: Normal affect  MSK: Normal gait      PERTINENT STUDIES:  Most recent CBC:  Recent Labs   Lab Test 10/25/19  0907   WBC 7.1   HGB 12.9*   HCT 43.7        Most recent " hepatic panel:  Recent Labs   Lab Test 10/25/19  0907   ALT 30   AST 25     Most recent creatinine:  No lab results found.

## 2020-07-28 ENCOUNTER — PATIENT OUTREACH (OUTPATIENT)
Dept: GASTROENTEROLOGY | Facility: CLINIC | Age: 30
End: 2020-07-28

## 2020-07-28 NOTE — PROGRESS NOTES
Contacted patient as he is scheduled for two visits with two gi  provider. .  Appointment with Ms. Kang works better for his work schedule. Now reschedule as a video visit.

## 2020-08-03 ENCOUNTER — VIRTUAL VISIT (OUTPATIENT)
Dept: GASTROENTEROLOGY | Facility: CLINIC | Age: 30
End: 2020-08-03
Payer: COMMERCIAL

## 2020-08-03 VITALS — HEIGHT: 74 IN | BODY MASS INDEX: 30.16 KG/M2 | WEIGHT: 235 LBS

## 2020-08-03 DIAGNOSIS — K51.00 ULCERATIVE PANCOLITIS WITHOUT COMPLICATION (H): Primary | ICD-10-CM

## 2020-08-03 ASSESSMENT — PAIN SCALES - GENERAL: PAINLEVEL: NO PAIN (0)

## 2020-08-03 ASSESSMENT — MIFFLIN-ST. JEOR: SCORE: 2095.7

## 2020-08-03 NOTE — NURSING NOTE
"Chief Complaint   Patient presents with     RECHECK     6 months follow up       Vitals:    08/03/20 0645   Weight: 106.6 kg (235 lb)   Height: 1.88 m (6' 2\")       Body mass index is 30.17 kg/m .    Kristan Tellez CMA    "

## 2020-08-03 NOTE — LETTER
8/3/2020         RE: Jose Alejandro Valentine  [address]  [place] [address]        Dear Colleague,    Thank you for referring your patient, Jose Alejandor Valentine, to the [facility]. Please see a copy of my visit note below.    Jose Alejandro Valentine is a 30 year old male who is being evaluated via a billable video visit.        Video-Visit Details    Type of service:  Video Visit    Video Start Time: 0703  Video End Time: 7:16 AM    Originating Location (pt. 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  Ulcerative proctitis: Feeling well clinically on lialda monotherapy.  Mucosal healing has not been confirmed.  We will proceed with a fecal calprotectin in addition to routine labs.  If evidence of active inflammation, recommend flex sig.   -- Continue Lialda 4.8g per day  -- Labs CBC, LFTs, CRP, ESR, iron studies, creatinine    2. Anemia, iron deficient. Noted on labs 10/2019. Took PO supp x 4 months. Will repeat labs. If still deficient plan to do IV supp and consider repeat scope for concern of ongoing inflammation.  -- repeat iron studies    3. Elevated creatinine: Noted in July 2019. Plan to repeat with labs now  -- Creatinine recheck.     IBD HISTORY  Age at diagnosis: 27 (feb 2018)  Extent of disease: Proctitis   Current UC medications:   - Lialda 4.8g per day  Prior UC surgeries: None  Prior IBD Medications:  Prednisone x2 (once was IV steroids in hospital)  Canasa - no benefit  Rowasa - no benefit  Hydrocortisone enema     DRUG MONITORING  TPMT enzyme activity:     6-TGN/6-MMPN levels:    Biologic concentration:    DISEASE ASSESSMENT  Labs:  Recent Labs   Lab Test 10/25/19  0907   CRP 3.0   SED 9     Endoscopic  assessment: flex sig 4/2019 proctitis.  Enterography: --  Fecal calprotectin: PENDING  C diff: --    sIBDQ:  No flowsheet data found.     IBD Health Care Maintenance:    Vaccinations:  All patients on biologics should avoid live vaccines.    -- Influenza (every year)  -- TdaP (every 10 years)  -- Pneumococcal Pneumonia (once plus booster at 5 years)  -- Yearly assessment for latent Tb (verbal screening and exam, PPD or QuantiFERON-Tb testing)    One time confirmation of immunity or serologies:  -- Hepatitis A (serologies or immunizations)  -- Hepatitis B (serologies or immunizations)  -- Varicella  -- MMR  -- HPV (all aged 18-26)  -- Meningococcal meningitis (all patients at risk for meningitis)    Bone mineral density screening   -- Recommend all patients supplement with calcium and vitamin D    Cancer Screening:  Colon cancer screening:  Recommend colonoscopy in 2026 to assess histologic extent of disease    Skin cancer screening: Annual visual exam of skin by dermatologist since patient is immunocompromised    Depression Screening:  PHQ-2 Score:     PHQ-2 ( 1999 Pfizer) 10/25/2019   Q1: Little interest or pleasure in doing things 1   Q2: Feeling down, depressed or hopeless 2   PHQ-2 Score 3   Q1: Little interest or pleasure in doing things Several days   Q2: Feeling down, depressed or hopeless More than half the days   PHQ-2 Score 3      Misc:  -- Avoid tobacco use  -- Avoid NSAIDs as there is potentially a 25% chance of causing an IBD flare    Return to clinic in 6 months    Thank you for this consultation.  It was a pleasure to participate in the care of this patient; please contact us with any further questions.     This note was created with voice recognition software, and while reviewed for accuracy, typos may remain.       Juan Kang PA-C  Division of Gastroenterology, Hepatology and Nutrition  [facility]     HPI:   Here for follow-up. Feeling well on Lialda monotherapy.  1-2 stools per  "day, formed. No blood in the stool  No fecal incontinence or nighttime stools. No EIM.    Had evidence of iron deficiency, took PO iron supp x 4 months after last visit.    Lee score:   Stool freq: 0 (baseline stools frequency)  Rectal bleedin (None)  PGA: 0 (normal)  Endoscopy: 0 (normal mucosa)    Remission: <3   Mild disease: 3-5  Moderate disease: 6-10  Severe disease: >10    ROS:    No fevers or chills  No weight loss  No blurry vision, double vision or change in vision  No sore throat  No lymphadenopathy  No headache, paraesthesias, or weakness in a limb  No shortness of breath or wheezing  No chest pain or pressure  No arthralgias or myalgias  No rashes or skin changes  No odynophagia or dysphagia  No BRBPR, hematochezia, melena  No dysuria, frequency or urgency  No hot/cold intolerance or polyria  No anxiety or depression    Extra intestinal manifestations of IBD:  No uveitis/episcleritis  No aphthous ulcers   No arthritis   No erythema nodosum/pyoderma gangrenosum.     PERTINENT PAST MEDICAL HISTORY:  Past Medical History:   Diagnosis Date     Ulcerative proctitis (H)        PREVIOUS SURGERIES:  No past surgical history on file.    PREVIOUS ENDOSCOPY:  Colonoscopy 2018: Normal ileum. Normal colon above rectum. \"Moderaly erythematous, friable, ulcerated mucosa in the rectum - 3-4cm from anal verge\"  Path: Moderately active acute and chronic colitis.     Flex si2019: proctitis: \"Congested, erythematous, hemorraghic, inflamed and ulcerated mucosa in the rectum\"  Path: Active chronic colitis consistent with IBD    ALLERGIES:   No Known Allergies    PERTINENT MEDICATIONS:    Current Outpatient Medications:      mesalamine (LIALDA) 1.2 g EC tablet, Take 4 tablets (4.8 g) by mouth daily, Disp: 360 tablet, Rfl: 3     multivitamin w/minerals (MULTI-VITAMIN) tablet, Take 1 tablet by mouth daily, Disp: , Rfl:     SOCIAL HISTORY:  Social History     Socioeconomic History     Marital status:      " "Spouse name: Not on file     Number of children: Not on file     Years of education: Not on file     Highest education level: Not on file   Occupational History     Not on file   Social Needs     Financial resource strain: Not on file     Food insecurity     Worry: Not on file     Inability: Not on file     Transportation needs     Medical: Not on file     Non-medical: Not on file   Tobacco Use     Smoking status: Former Smoker     Packs/day: 0.00     Smokeless tobacco: Never Used     Tobacco comment: Quit 1/1/2020   Substance and Sexual Activity     Alcohol use: Not on file     Drug use: Not on file     Sexual activity: Not on file   Lifestyle     Physical activity     Days per week: Not on file     Minutes per session: Not on file     Stress: Not on file   Relationships     Social connections     Talks on phone: Not on file     Gets together: Not on file     Attends Restorationist service: Not on file     Active member of club or organization: Not on file     Attends meetings of clubs or organizations: Not on file     Relationship status: Not on file     Intimate partner violence     Fear of current or ex partner: Not on file     Emotionally abused: Not on file     Physically abused: Not on file     Forced sexual activity: Not on file   Other Topics Concern     Parent/sibling w/ CABG, MI or angioplasty before 65F 55M? Not Asked   Social History Narrative     Not on file       FAMILY HISTORY:  No family history on file.    Past/family/social history reviewed and no changes    PHYSICAL EXAMINATION:  Constitutional: aaox3, cooperative, pleasant, not dyspneic/diaphoretic, no acute distress  Vitals reviewed: Ht 1.88 m (6' 2\")   Wt 106.6 kg (235 lb)   BMI 30.17 kg/m    Wt:   Wt Readings from Last 2 Encounters:   08/03/20 106.6 kg (235 lb)   02/04/20 104.1 kg (229 lb 6.4 oz)      Constitutional - general appearance is well and in no acute distress. Body habitus normal  Eyes - No redness or discharge  Respiratory - No cough, " unlabored breathing  Musculoskeletal - range of motion intact: Neck and arms  Skin - No discoloration or lesions  Neurological - No tremors, headaches  Psychiatric - No anxiety, alert & oriented    PERTINENT STUDIES:  Most recent CBC:  Recent Labs   Lab Test 10/25/19  0907   WBC 7.1   HGB 12.9*   HCT 43.7        Most recent hepatic panel:  Recent Labs   Lab Test 10/25/19  0907   ALT 30   AST 25     Most recent creatinine:  No lab results found.           Again, thank you for allowing me to participate in the care of your patient.        Sincerely,        Juan Kang PA-C

## 2020-08-03 NOTE — PATIENT INSTRUCTIONS
It was a pleasure taking care of you today.  I've included a brief summary of our discussion and care plan from today's visit below.  Please review this information with your primary care provider.  ______________________________________________________________________    My recommendations are summarized as follows:    -- Continue Lialda 4.8 g per day  -- Labs when able  -- Stool sample   The order for this is in, can be completed at our lab. To schedule lab appointment here, use my chart or call [phone].     -- Next endoscopic assessment: pending stool sample   -- Patient with IBD we recommend supplementation vitamin D 1000 units daily and calcium 500 mg twice daily.  -- Vaccines/immunizations to be updated: Recommend yearly flu shot, pneumonia vaccines (Prevnar 13 then 8 weeks later Pneumovax 23 then 5 years later Pneumovax 23), tetanus every 10 years.  -- No NSAIDs (ibuprofen, or anything containing ibuprofen)     For additional resources about inflammatory bowel disease visit [email or web address]      Return to GI Clinic in 6 months to review your progress.    ______________________________________________________________________    Who do I call with any questions after my visit?  Please be in touch if there are any further questions that arise following today's visit.  There are multiple ways to contact your gastroenterology care team.        During business hours, you may reach a Gastroenterology nurse at [phone], option 3.       To schedule or reschedule an appointment, please call [phone].       You can always send a secure message through FOBO.  FOBO messages are answered by your nurse or doctor typically within 24 hours.  Please allow extra time on weekends and holidays.        For urgent/emergent questions after business hours, you may reach the on-call GI Fellow by contacting the [facility]  at [phone].      In order for your refill to be  processed in a timely fashion, it is your responsibility to ensure you follow the recommendations from your provider regarding your laboratory studies and follow up appointments.       How will I get the results of any tests ordered?    You will receive all of your results.  If you have signed up for [facility], any tests ordered at your visit will be available to you after your physician reviews them.  Typically this takes 1-2 weeks.  If there are urgent results that require a change in your care plan, your physician or nurse will call you to discuss the next steps.      What is MDJunction?  MDJunction is a secure way for you to access all of your healthcare records from the [facility].  It is a web based computer program, so you can sign on to it from any location.  It also allows you to send secure messages to your care team.  I recommend signing up for MDJunction access if you have not already done so and are comfortable with using a computer.      How to I schedule a follow-up visit?  If you did not schedule a follow-up visit today, please call [phone] to schedule a follow-up office visit.        Sincerely,    Juan Kang PA-C  [facility]  Division of Gastroenterology

## 2021-01-15 ENCOUNTER — HEALTH MAINTENANCE LETTER (OUTPATIENT)
Age: 31
End: 2021-01-15

## 2021-04-25 DIAGNOSIS — K51.211 CHRONIC ULCERATIVE PROCTITIS WITH RECTAL BLEEDING (H): ICD-10-CM

## 2021-04-27 NOTE — TELEPHONE ENCOUNTER
mesalamine (LIALDA) 1.2 g EC tablet      Last Written Prescription Date:  2020  Last Fill Quantity: 360 tab,   # refills: 3  Last Office Visit : 8/3/2020  Future Office visit:  none    Routing refill request to provider for review/approval because:  Drug not active on patient's medication list-   - [facility] labs per medication protocol past due: CBC, ALT, Creatinine  - future lab orders in computer

## 2021-10-07 RX ORDER — MESALAMINE 1.2 G/1
4.8 TABLET, DELAYED RELEASE ORAL DAILY
Qty: 360 TABLET | Refills: 0 | OUTPATIENT
Start: 2021-10-07

## 2021-10-24 ENCOUNTER — HEALTH MAINTENANCE LETTER (OUTPATIENT)
Age: 31
End: 2021-10-24

## 2022-02-13 ENCOUNTER — HEALTH MAINTENANCE LETTER (OUTPATIENT)
Age: 32
End: 2022-02-13

## 2022-06-20 NOTE — PATIENT INSTRUCTIONS
History   Chief Complaint:  Back pain     The history is provided by the patient and a parent.      Chante Nair is a 25 year old female with history of anxiety who presents with non-radiating lower right back pain since 0915, approximately 1 hour ago.  Her mom reports her waking up with severe pain as noted above, attempting to relieve with Children's Motrin with provided no assistance. Concern over this prompted their arrival at the ED, and on the way here, the patient experienced paraesthesia and muscle spasms to her hands.  Here in the ED, the patient reports constant pain to her right lower back with weakness to her arms and paraesthesia to her legs. The patient denies trauma to her back, nausea, abdominal pain, chest pain, emesis, diarrhea, change in urine or dysuria, as well as use of contraceptives.  Her mom notes history of anxiety, including anxiety over pain, although the patient has not experienced an anxiety attack that resulted in pain before.  She denies history of hypertension and diabetes or use of daily medications.    Review of Systems   Constitutional:        (-) trauma to back   Cardiovascular: Negative for chest pain.   Gastrointestinal: Negative for abdominal pain, diarrhea, nausea and vomiting.   Genitourinary: Negative.  Negative for dysuria.   Musculoskeletal: Positive for back pain (lower right).        (+) muscle spasm in hands   Neurological:        () tingling to arms and legs   Psychiatric/Behavioral: The patient is nervous/anxious.    All other systems reviewed and are negative.    Allergies:  Egg yolk    Medications:  The patient denies use of daily medications.    Past Medical History:     Scoliosis  Anemia  Anxiety     Past Surgical History:    Ganglion cyst excision, left     Family History:    Diabetes  Hypertension  Obesity  Lipids  Anxiety disorder  CAD  Leukemia     Social History:  The patient arrived with her mother.  The patient arrived in a private vehicle.    Physical  Great to meet you today      uceris foam   Check in or Dania in 2 weeks or sooner if not better      Labs today  Lab tests today at the 1st floor -- you will be notified of results by letter or my chart message in 7-10 days.  You will receive a phone call if more urgent follow up is needed.        If not better in 2 weeks will do a flex sig        Follow up 3 to 4 months     For questions regarding your care Monday through Friday, contact the RN GI care coordinator,  Call   [phone] . Your call will be  returned same day, or if consultation is needed with the provider, it may be following business day - or you may send a My Chart message.    For medication refills (prescribed by the GI clinic), contact your pharmacy.    For appointment rescheduling/cancellation, contact [phone]     After hours, or if you have an immediate GI concern and cannot wait for a return call, contact the GI Fellow at [phone] and select option #4.     Thanks Dania Solano RN Care Coordinator for Dr. Riggs  Phone   [phone]            Exam     Patient Vitals for the past 24 hrs:   BP Pulse Resp SpO2   06/20/22 1259 115/75 -- -- --   06/20/22 1030 112/74 97 13 --   06/20/22 1015 122/76 90 14 100 %   06/20/22 1010 127/80 92 23 100 %   06/20/22 1005 -- 98 18 100 %   06/20/22 1002 123/78 102 19 100 %   06/20/22 1000 123/78 105 24 100 %   06/20/22 0955 -- (!) 126 20 100 %   06/20/22 0953 (!) 146/89 (!) 129 -- 99 %     Physical Exam  Vitals: reviewed by me  General: Pt seen on [facility], pleasant, cooperative, and alert to conversation.  Very anxious appearing.  Eyes: Tracking well, clear conjunctiva BL  ENT: MMM, midline trachea.   Lungs: No tachypnea, no accessory muscle use. No respiratory distress.   CV: Rate as above, normal S1-S2, no additional heart sounds noted  Abd: Soft, minimal right lower quadrant tenderness to palpation, no guarding, no rebound.  No CVA tenderness bilaterally.  MSK: no joint effusion.  No evidence of trauma  Skin: No rash  Neuro: Clear speech and no facial droop.  Moving all extremity spontaneously, following all commands.  Full range of motion and full functionality of hands noted after Ativan and hyperventilation resolved, however initially did present with what appeared to be carpal pedal spasms.  Psych: Not RIS, no e/o AH/VH    Emergency Department Course     Imaging:  CT Abdomen Pelvis w Contrast   Preliminary Result   IMPRESSION:    1.  Obstructing 0.2 cm stone at the right ureterovesical junction   causes mild right hydronephrosis and mildly delayed enhancement of the   right kidney.   2.  Moderate amount of stool in the ascending and transverse colon.        Report per radiology    Laboratory:  Labs Ordered and Resulted from Time of ED Arrival to Time of ED Departure   COMPREHENSIVE METABOLIC PANEL - Abnormal       Result Value    Sodium 134      Potassium 4.3      Chloride 104      Carbon Dioxide (CO2) 21      Anion Gap 9      Urea Nitrogen 8      Creatinine 0.68      Calcium 10.2 (*)     Glucose 153 (*)      Alkaline Phosphatase 79      AST 10      ALT 26      Protein Total 8.2      Albumin 4.2      Bilirubin Total 0.4      GFR Estimate >90     ROUTINE UA WITH MICROSCOPIC REFLEX TO CULTURE - Abnormal    Color Urine Straw      Appearance Urine Clear      Glucose Urine Negative      Bilirubin Urine Negative      Ketones Urine 10  (*)     Specific Gravity Urine 1.022      Blood Urine Moderate (*)     pH Urine 7.5 (*)     Protein Albumin Urine Negative      Urobilinogen Urine Normal      Nitrite Urine Negative      Leukocyte Esterase Urine Negative      RBC Urine 27 (*)     WBC Urine 2      Squamous Epithelials Urine <1     CBC WITH PLATELETS AND DIFFERENTIAL - Abnormal    WBC Count 15.9 (*)     RBC Count 5.08      Hemoglobin 13.0      Hematocrit 40.9      MCV 81      MCH 25.6 (*)     MCHC 31.8      RDW 14.5      Platelet Count 442      % Neutrophils 76      % Lymphocytes 18      % Monocytes 4      % Eosinophils 0      % Basophils 1      % Immature Granulocytes 1      NRBCs per 100 WBC 0      Absolute Neutrophils 12.0 (*)     Absolute Lymphocytes 2.9      Absolute Monocytes 0.7      Absolute Eosinophils 0.0      Absolute Basophils 0.1      Absolute Immature Granulocytes 0.1      Absolute NRBCs 0.0     HCG QUANTITATIVE PREGNANCY - Normal    hCG Quantitative <1       Emergency Department Course:    Reviewed:  I reviewed nursing notes, vitals, past medical history and Care Everywhere.    Assessments:  0950 I obtained history and examined the patient as noted above.   1103 I rechecked the patient.  1236 I rechecked the patient and explained findings. I believe that they are safe for discharge at this time.    Interventions:  0956 Lorazepam 1 mg IV  1007 NS 1 L IV  1135 Ketorolac 15 mg PO  1135 Ondansetron 4 mg IV    Disposition:  The patient was discharged to home.     Impression & Plan     Medical Decision Making:  This is a very pleasant 25-year-old female who presents to the emergency room with appears to be a kidney stone  causing back pain.  This in turn seems to have caused an anxiety attack, which led to her carpopedal spasms.  Thankfully these have resolved with Ativan and she is now resting comfortably.  On my reassessment, she has no pain at all, and think she may have passed it while urinating.  Her abdomen again is pain-free at this time, and she tells me she feels dramatically improved, is 0 out of 10 in discomfort, and would like to go home.  We talked about return to ED precautions, pain control for her kidney stone, and how she should follow-up with her regular doctor or urology if she has any additional pain as that would be evidence of a stone that has not yet passed and therefore would benefit from urology.  We talked about over-the-counter medication she can use as well, mother is at bedside, all questions were answered, appears to be a good source of support as well.  Does appear to be stable for outpatient management, tolerating orals at this time, and has normal vital signs, again all of her symptoms do seem to be explained by the kidney stone which I feel both the patient and her family now have a good handle on.  We will plan for discharge as above    Diagnosis:    ICD-10-CM    1. Kidney stone  N20.0    2. Anxiety reaction  F41.1      Discharge Medications:  Discharge Medication List as of 6/20/2022 12:47 PM      START taking these medications    Details   HYDROcodone-acetaminophen (NORCO) 5-325 MG tablet Take 1 tablet by mouth every 6 hours as needed for pain, Disp-12 tablet, R-0, Local Print      ibuprofen (ADVIL/MOTRIN) 600 MG tablet Take 1 tablet (600 mg) by mouth every 8 hours as needed for moderate pain, Disp-30 tablet, R-0, Local Print      ondansetron (ZOFRAN ODT) 4 MG ODT tab Take 1 tablet (4 mg) by mouth every 8 hours as needed, Disp-10 tablet, R-0, Local Print           Scribe Disclosure:  Rose Marie PEGUERO, am serving as a scribe at 9:58 AM on 6/20/2022 to document services personally performed by  Floyd Pappas MD based on my observations and the provider's statements to me.     I, Xin Traore, am serving as a scribe at 0958 on 6/20/2022 to document services personally performed by Floyd Pappas MD based on my observations and the provider's statements to me.          Floyd Pappas MD  06/21/22 0743       Floyd Pappas MD  06/21/22 0745

## 2022-10-16 ENCOUNTER — HEALTH MAINTENANCE LETTER (OUTPATIENT)
Age: 32
End: 2022-10-16

## 2023-03-26 ENCOUNTER — HEALTH MAINTENANCE LETTER (OUTPATIENT)
Age: 33
End: 2023-03-26

## 2023-07-29 ENCOUNTER — TELEPHONE (OUTPATIENT)
Dept: FAMILY MEDICINE | Facility: CLINIC | Age: 33
End: 2023-07-29

## 2023-07-29 ENCOUNTER — HOSPITAL ENCOUNTER (OUTPATIENT)
Facility: CLINIC | Age: 33
Setting detail: OBSERVATION
Discharge: HOME | End: 2023-07-31
Attending: STUDENT IN AN ORGANIZED HEALTH CARE EDUCATION/TRAINING PROGRAM | Admitting: STUDENT IN AN ORGANIZED HEALTH CARE EDUCATION/TRAINING PROGRAM
Payer: COMMERCIAL

## 2023-07-29 DIAGNOSIS — R21 RASH AND NONSPECIFIC SKIN ERUPTION: Primary | ICD-10-CM

## 2023-07-29 LAB
ALBUMIN UR-MCNC: NEGATIVE MG/DL
APPEARANCE UR: CLEAR
BILIRUB UR QL STRIP: NEGATIVE
COLOR UR AUTO: NORMAL
GLUCOSE UR STRIP-MCNC: NEGATIVE MG/DL
HGB UR QL STRIP: NEGATIVE
KETONES UR STRIP-MCNC: NEGATIVE MG/DL
LEUKOCYTE ESTERASE UR QL STRIP: NEGATIVE
NITRATE UR QL: NEGATIVE
PH UR STRIP: 5.5 [PH] (ref 5–7)
RBC URINE: 1 /HPF
SP GR UR STRIP: 1 (ref 1–1.03)
UROBILINOGEN UR STRIP-MCNC: NORMAL MG/DL
WBC URINE: 2 /HPF

## 2023-07-29 PROCEDURE — 81001 URINALYSIS AUTO W/SCOPE: CPT

## 2023-07-29 PROCEDURE — 120N000003 HC R&B IMCU UMMC

## 2023-07-29 PROCEDURE — 250N000013 HC RX MED GY IP 250 OP 250 PS 637

## 2023-07-29 PROCEDURE — 99222 1ST HOSP IP/OBS MODERATE 55: CPT | Mod: GC | Performed by: STUDENT IN AN ORGANIZED HEALTH CARE EDUCATION/TRAINING PROGRAM

## 2023-07-29 RX ORDER — LIDOCAINE 40 MG/G
CREAM TOPICAL
Status: DISCONTINUED | OUTPATIENT
Start: 2023-07-29 | End: 2023-07-31 | Stop reason: HOSPADM

## 2023-07-29 RX ORDER — HYDROXYZINE HYDROCHLORIDE 50 MG/1
50 TABLET, FILM COATED ORAL EVERY 6 HOURS PRN
Status: DISCONTINUED | OUTPATIENT
Start: 2023-07-29 | End: 2023-07-31 | Stop reason: HOSPADM

## 2023-07-29 RX ORDER — ACETAMINOPHEN 325 MG/1
975 TABLET ORAL EVERY 8 HOURS
Status: DISCONTINUED | OUTPATIENT
Start: 2023-07-29 | End: 2023-07-30

## 2023-07-29 RX ORDER — HYDROXYZINE HYDROCHLORIDE 25 MG/1
25 TABLET, FILM COATED ORAL EVERY 6 HOURS PRN
Status: DISCONTINUED | OUTPATIENT
Start: 2023-07-29 | End: 2023-07-31 | Stop reason: HOSPADM

## 2023-07-29 RX ADMIN — ACETAMINOPHEN 975 MG: 325 TABLET, FILM COATED ORAL at 22:09

## 2023-07-29 ASSESSMENT — COLUMBIA-SUICIDE SEVERITY RATING SCALE - C-SSRS
4. HAVE YOU HAD THESE THOUGHTS AND HAD SOME INTENTION OF ACTING ON THEM?: NO
6. HAVE YOU EVER DONE ANYTHING, STARTED TO DO ANYTHING, OR PREPARED TO DO ANYTHING TO END YOUR LIFE?: NO
3. HAVE YOU BEEN THINKING ABOUT HOW YOU MIGHT KILL YOURSELF?: NO
2. HAVE YOU ACTUALLY HAD ANY THOUGHTS OF KILLING YOURSELF IN THE PAST MONTH?: NO
5. HAVE YOU STARTED TO WORK OUT OR WORKED OUT THE DETAILS OF HOW TO KILL YOURSELF? DO YOU INTEND TO CARRY OUT THIS PLAN?: NO
1. IN THE PAST MONTH, HAVE YOU WISHED YOU WERE DEAD OR WISHED YOU COULD GO TO SLEEP AND NOT WAKE UP?: NO

## 2023-07-29 ASSESSMENT — ACTIVITIES OF DAILY LIVING (ADL)
ADLS_ACUITY_SCORE: 31

## 2023-07-29 NOTE — CONFIDENTIAL NOTE
Transfer Type: [facility]  Transfer Triage Note    Date of call: 07/29/23  Time of call: 4:16 PM    Current Patient Location:  [facility]  Current Level of Care: ED    Vitals: Tm 100.8  BP:122/70, VSOS   Diagnosis: UC flare with new rash, fever, despite abx, concern for new rheumatologic   Is COVID-19 a concern? No  Reason for requested transfer: Further diagnostic work up, management, and consultation for specialized care   Isolation Needs: None    Outside Records: Available  Additional records may be faxed to [phone].    Transfer accepted: Yes  Stability of Patient: Patient is vitally stable, with no critical labs, and will likely remain stable throughout the transfer process  Level of Care Needed: Med Surg  Telemetry Needed:  None  Expected Time of Arrival for Transfer: 8-24 hours  Arrival Location:  [facility]    Recommendations for Management and Stabilization: Not needed    Additional Comments: 32yo M with hx of Crohns colitis who has presented to the ED three times in the past week.  Initially for facial pain, which then developed into facial rash.  Treated for facial cellulitis with TMP-SMX and Cefalexin, then Doxycyline. Failed to improve, rash appearing more malar, now spreading to chest.  Recurrent fevers, Cr 1.5, CRP 2.9, ESR 40 (increased from previous).  Discussed that patient will likely have to go on waiting list pending bed availability.  ED to consider admission at [facility] pending definitive admission with access to Derm/Rheum.    Viraj Acuna MD

## 2023-07-29 NOTE — H&P
[facility]    History and Physical - Medicine Service, [facility] TEAM        Date of Admission:  7/29/2023    Assessment & Plan     Jose Alejandro Valentine is a 33 year old male with a history of ulcerative colitis and is presented with progressive MP rash/redness, fever and weight loss for 2 weeks.     # Progressive cephalocaudal MP rash  # Subacute fever with weight loss  # Neck and back stiffness  # Elevated inflammatory markers  A middle-age male with a history of progressive generalized MP rash with fever and weight loss for 2 weeks. I will approach by different etiologies of infection/inflammation, malignancy, drug-induced, metabolic, and autoimmune causes.     For the infection/inflammation, multiple viral infection and cause fever with generalized rash. He didn't have any URI or GI symptoms, so most of the acute-onset viral infections are unlikely. However, the more chronic viral infections, e.g. EBV, CMV, HIV are totally possible. He did has a history of palpable neck nodes, even without sore throat or hepatosplenomegaly, sending viral antibodies is relatively easy. I'm not concern about viral hepatitis since his liver function is normal. Other bacterial infection is unlikely to have 2 week course without looking very sepsis. The possible rare entities might be topical infections, e.g. typhus, leptospirosis, but he has no travel history plus the fever should subside after Doxycycline. I will hold antibiotics (get Cef-3 from the ED) for now.    For malignancy, it is more common to have fever in hematologic > solid malignancy. Given his fever and weight loss, heme cancer can present easily with rash as paraneoplastic syndrome. His CBC is normal with very mild microcytic anemia (Hb 12.9, MCV 76) without leukocytosis. I will send peripheral blood smear and LDH. No drug was recently started, so drug reaction is excluded. His vital sign is normal, but with the weight loss  with rash, thyrotoxicosis might be considered.     Given the nature of his symptoms that are systemic (rash, stiffness, neuro), subacute with elevated inflammatory markers, and a personal history of ulcerative colitis. Rash in UC is usually erythema nodosum, no compatible with his presentation and his disease is well-controlled. His symptoms are most likely fall in autoimmune etiologies with multiple possible diseases, e.g. SLE, dermatomyositis, etc. I will defer this to rheumatology team which have better insight of the investigations.     - EBV early antibody, CMV IgM-IgG, HIV   - LDH, peripheral blood smear  - TSH  - ESR, CRP  - Consult Rheumatology  - Consult Dermatology    # Transient loss of consciousness  This morning he has 2 episodes of unwitnessed loss of consciousness. I'm not sure whether this is syncope or seizure. He had fecal incontinence afterward, which supports seizure, but without other post-ictal symptoms (e.g. weakness). His initial non-contrast CT brain was normal, although this is not excluded small intracranial pathology, it reassures that there is no bleeding. Whether to pursue full workup of possible first episode of seizure, I will defer it to his primary team.    For syncope, the orthostatic or reflex-mediated is the most likely causes. He has been eating less than normal with diarrhea, so hypovolemia is possible. He doesn't have other suspicious medicines that might cause orthostatic hypotension. His prodrome symptoms of nausea and dizziness plus sweating all support reflex-mediated syncope. I'm not too worried about cardiogenic syncope since there are no history of chest pain, no family history of sudden death, and normal cardiac exam. However, to be safe, I will put him on telemetry during the hospital stay.     - Orthostatic BP  - EKG  - On telemetry    # Acute Kidney Injury  Minimal rise in creatinine of 1.5 from baseline of ~1. Probably from pre-renal cause given his history of  "decrease appetite with fever (insensible loss)  - Follow-up creatinine  - UA - normal    #Ulcerative colitis  on Mesalamine 4.8 g daily, well controlled with minimal blood in stool   - CHM     Diet:  regular   DVT Prophylaxis: Low Risk/Ambulatory with no VTE prophylaxis indicated  Panda Catheter: Not present  Fluids: None  Lines: None     Cardiac Monitoring: None  Code Status:  full    Clinically Significant Risk Factors Present on Admission                       # Overweight: Estimated body mass index is 26.95 kg/m  as calculated from the following:    Height as of this encounter: 1.88 m (6' 2\").    Weight as of this encounter: 95.2 kg (209 lb 14.4 oz).            Disposition Plan         The patient's care was discussed with the Attending Physician, Dr. Acuna .      Triny Saavedra MD  PGY-1 ([id number])  Medicine Service, [facility]  Securely message with [employer] (more info)  Text page via [employer] Paging/Directory   See signed in provider for up to date coverage information  ______________________________________________________________________    Chief Complaint   Jose Alejandro Valentine is a 33 year old male with a history of ulcerative colitis and is presented with progressive MP rash/redness, fever and weight loss for 2 weeks.    History is obtained from the patient    History of Present Illness   Two weeks ago, he had redness and swelling on his nose then to both of his cheeks, with some lumps under his neck. He also has fever measured from 99-101F, shivering, and night sweat. He has generalized headache that is throbbing in quality, predominantly on bilateral frontal regions without any vision changes. He went to the ED on 7/20 with nose pain and fever . CT facial bone was unremarkable, and he was treated with Rocephin and Bactrim then Cefalexin + Bactrim. The swelling in his nose was improving and he doesn't have much pain after taking the medicine, " but still has fever and persistent rash. His usual weight was 220 Lb, but now 209 Lb.    Three days ago, he developed bilateral neck pain that worse with touching or moving. His neck and back are stiff, especially in the morning, the symptoms become better during the day. He went to the ED on . His WBC and CRP were less than the previous lab. He got treated with Doxycycline. Yesterday, new rash developed all over his body (chest, back, arms, upper thigh). The rash is very itching without blistering.     This morning, he passed out 2 times. He remember drinking water and having nausea and dizziness before the first episode, then he woke up on the floor. He tried to stand up but then fell and passed out again, and woke up on different spot. He was soaked with sweat and had fecal incontinence after he woke up. There is no witness during his loss of consciousness. He doesn't recall any weakness or numbness after the episode. He has less appetite for this episode and has been eating less. He tried to compensate by drinking more water. He has diarrhea after starting Doxycycline. He doesn't have any chest pain or palpitation before. He never had seizure.     He went to an ED in [place], with unremarkable lab, EKG, and CT brain. He got treated with Ceftriaxone and referred for further evaluation of possible systemic diseases.    His grandma  of heart attack. No familial history of cancer. 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 He never travels out of [facility].    He quitted smoking past December with a history of ~20 pack-year. He was addicted to alcohol but completely quitted since 2 years ago. He prefers not to use opioid.    Past Medical History    Past Medical History:   Diagnosis Date    Ulcerative proctitis (H)        Past Surgical History   No past surgical history on file.    Prior to Admission Medications   Prior to Admission Medications   Prescriptions  Last Dose Informant Patient Reported? Taking?   multivitamin w/minerals (MULTI-VITAMIN) tablet   Yes No   Sig: Take 1 tablet by mouth daily      Facility-Administered Medications: None      ------------------------------------------------------------------------     Physical Exam   Vital Signs: Temp: 98.7  F (37.1  C) Temp src: Oral BP: (!) 123/95 Pulse: 88   Resp: 20 SpO2: 98 % O2 Device: None (Room air)    Weight: 209 lbs 14.4 oz    General Appearance: Oriented, alert, without pain  ENT: no lymphadenopathy, no oral ulcers   Respiratory: no dyspnea, no retraction, clear BL  Cardiovascular: regular, no murmur  GI: soft, not tender, no hepatosplenomegaly, no flank pain  Skin:  Face - red patch on his nose and cheeks  Neck - redness with mild tenderness with touch around his neck  Trunk/chest - blanchable coalescing MP rash  Arms/Upper thigh - minimal blanchable MP rash (small red dots)    Other: grossly intact neuro    Medical Decision Making               Data         Imaging results reviewed over the past 24 hrs:   Recent Results (from the past 24 hour(s))   CT HEAD BRAIN WO    Narrative    For Patients:  As a result of the 21st Century Cures Act, medical imaging exams and procedure reports are released immediately into your electronic medical record.  You may view this report before your referring provider.  If you have questions, please contact your health care provider.      INDICATION:  syncope, fall    TECHNIQUE:  CT of the head without contrast. Coronal and sagittal reformats. Bone and soft tissue algorithms.     COMPARISON:  No prior studies available for comparison at this institution.    FINDINGS:  No acute intracranial hemorrhage or extraaxial collection. No evidence of acute cortical infarction. No mass effect or midline shift. The ventricles and sulci are age appropriate. Orbital contents are normal.  No calvarial fractures. No lytic or sclerotic osseous lesions within the calvarium or skull base. Scalp  and other imaged soft tissue structures are normal. Mastoid air cells are clear.     Impression    No acute intracranial abnormality.      Please note that all CT scans at this facility use dose modulation, iterative reconstruction, and/or weight-based dosing when appropriate to reduce radiation dose to as low as reasonably achievable.    Dictated by Manuel White MD @ 7/29/2023 2:14:11 PM    (Electronically Signed)

## 2023-07-29 NOTE — LETTER
[facility]D INTERMED[facility]ATE [facility]  [address] [facility] [address]  Phone: [phone]  Fax: [phone]    July 31, 2023        Jose Alejandro Valentine  [address]  [address]  [place] [facility] [place] [facility] [address]          To whom it may concern:    RE: Jose Alejandro Valentine    Mr. Valentine was treated by me from July 29th-August 4th. Please excuse him from work from August 5th to August 6th.    Patient may return to work on Monday, August 7th with no working or lifting restrictions    Please contact me for questions or concerns.      Sincerely,    Nieves Acuna MD  [facility]

## 2023-07-30 ENCOUNTER — APPOINTMENT (OUTPATIENT)
Dept: CARDIOLOGY | Facility: CLINIC | Age: 33
End: 2023-07-30
Attending: STUDENT IN AN ORGANIZED HEALTH CARE EDUCATION/TRAINING PROGRAM
Payer: COMMERCIAL

## 2023-07-30 PROBLEM — R55 SYNCOPE AND COLLAPSE: Status: ACTIVE | Noted: 2023-07-30

## 2023-07-30 LAB
ALBUMIN SERPL BCG-MCNC: 4 G/DL (ref 3.5–5.2)
ALP SERPL-CCNC: 59 U/L (ref 40–129)
ALT SERPL W P-5'-P-CCNC: 20 U/L (ref 0–70)
ANION GAP SERPL CALCULATED.3IONS-SCNC: 13 MMOL/L (ref 7–15)
AST SERPL W P-5'-P-CCNC: 28 U/L (ref 0–45)
BASOPHILS # BLD AUTO: 0 10E3/UL (ref 0–0.2)
BASOPHILS # BLD AUTO: 0 10E3/UL (ref 0–0.2)
BASOPHILS NFR BLD AUTO: 0 %
BASOPHILS NFR BLD AUTO: 0 %
BILIRUB DIRECT SERPL-MCNC: <0.2 MG/DL (ref 0–0.3)
BILIRUB SERPL-MCNC: 0.3 MG/DL
BUN SERPL-MCNC: 12.7 MG/DL (ref 6–20)
CALCIUM SERPL-MCNC: 9.2 MG/DL (ref 8.6–10)
CHLORIDE SERPL-SCNC: 104 MMOL/L (ref 98–107)
CREAT SERPL-MCNC: 0.98 MG/DL (ref 0.67–1.17)
CREAT SERPL-MCNC: 1.06 MG/DL (ref 0.67–1.17)
CRP SERPL-MCNC: 43 MG/L
DAT POLY: NEGATIVE
DEPRECATED HCO3 PLAS-SCNC: 19 MMOL/L (ref 22–29)
EOSINOPHIL # BLD AUTO: 0.2 10E3/UL (ref 0–0.7)
EOSINOPHIL # BLD AUTO: 0.2 10E3/UL (ref 0–0.7)
EOSINOPHIL NFR BLD AUTO: 4 %
EOSINOPHIL NFR BLD AUTO: 4 %
ERYTHROCYTE [DISTWIDTH] IN BLOOD BY AUTOMATED COUNT: 12.5 % (ref 10–15)
ERYTHROCYTE [DISTWIDTH] IN BLOOD BY AUTOMATED COUNT: 12.7 % (ref 10–15)
ERYTHROCYTE [SEDIMENTATION RATE] IN BLOOD BY WESTERGREN METHOD: 30 MM/HR (ref 0–15)
GFR SERPL CREATININE-BSD FRML MDRD: >90 ML/MIN/1.73M2
GFR SERPL CREATININE-BSD FRML MDRD: >90 ML/MIN/1.73M2
GLUCOSE SERPL-MCNC: 94 MG/DL (ref 70–99)
HCT VFR BLD AUTO: 41.9 % (ref 40–53)
HCT VFR BLD AUTO: 42.7 % (ref 40–53)
HGB BLD-MCNC: 13.8 G/DL (ref 13.3–17.7)
HGB BLD-MCNC: 14.7 G/DL (ref 13.3–17.7)
IMM GRANULOCYTES # BLD: 0 10E3/UL
IMM GRANULOCYTES # BLD: 0 10E3/UL
IMM GRANULOCYTES NFR BLD: 0 %
IMM GRANULOCYTES NFR BLD: 0 %
LDH SERPL L TO P-CCNC: 259 U/L (ref 0–250)
LVEF ECHO: NORMAL
LYMPHOCYTES # BLD AUTO: 1 10E3/UL (ref 0.8–5.3)
LYMPHOCYTES # BLD AUTO: 1.4 10E3/UL (ref 0.8–5.3)
LYMPHOCYTES NFR BLD AUTO: 21 %
LYMPHOCYTES NFR BLD AUTO: 30 %
MCH RBC QN AUTO: 30.1 PG (ref 26.5–33)
MCH RBC QN AUTO: 31.4 PG (ref 26.5–33)
MCHC RBC AUTO-ENTMCNC: 32.9 G/DL (ref 31.5–36.5)
MCHC RBC AUTO-ENTMCNC: 34.4 G/DL (ref 31.5–36.5)
MCV RBC AUTO: 91 FL (ref 78–100)
MCV RBC AUTO: 92 FL (ref 78–100)
MONOCYTES # BLD AUTO: 0.6 10E3/UL (ref 0–1.3)
MONOCYTES # BLD AUTO: 0.6 10E3/UL (ref 0–1.3)
MONOCYTES NFR BLD AUTO: 12 %
MONOCYTES NFR BLD AUTO: 12 %
NEUTROPHILS # BLD AUTO: 2.6 10E3/UL (ref 1.6–8.3)
NEUTROPHILS # BLD AUTO: 3.1 10E3/UL (ref 1.6–8.3)
NEUTROPHILS NFR BLD AUTO: 54 %
NEUTROPHILS NFR BLD AUTO: 63 %
NRBC # BLD AUTO: 0 10E3/UL
NRBC # BLD AUTO: 0 10E3/UL
NRBC BLD AUTO-RTO: 0 /100
NRBC BLD AUTO-RTO: 0 /100
PLAT MORPH BLD: NORMAL
PLATELET # BLD AUTO: 175 10E3/UL (ref 150–450)
PLATELET # BLD AUTO: 189 10E3/UL (ref 150–450)
POTASSIUM SERPL-SCNC: 4.3 MMOL/L (ref 3.4–5.3)
PROT SERPL-MCNC: 7.6 G/DL (ref 6.4–8.3)
RBC # BLD AUTO: 4.58 10E6/UL (ref 4.4–5.9)
RBC # BLD AUTO: 4.68 10E6/UL (ref 4.4–5.9)
RBC MORPH BLD: NORMAL
RETICS # AUTO: 0.04 10E6/UL (ref 0.03–0.1)
RETICS/RBC NFR AUTO: 0.9 % (ref 0.5–2)
SODIUM SERPL-SCNC: 136 MMOL/L (ref 136–145)
SPECIMEN EXPIRATION DATE: NORMAL
TSH SERPL DL<=0.005 MIU/L-ACNC: 2.65 UIU/ML (ref 0.3–4.2)
WBC # BLD AUTO: 4.8 10E3/UL (ref 4–11)
WBC # BLD AUTO: 4.9 10E3/UL (ref 4–11)

## 2023-07-30 PROCEDURE — 250N000013 HC RX MED GY IP 250 OP 250 PS 637

## 2023-07-30 PROCEDURE — 86235 NUCLEAR ANTIGEN ANTIBODY: CPT

## 2023-07-30 PROCEDURE — 86038 ANTINUCLEAR ANTIBODIES: CPT

## 2023-07-30 PROCEDURE — 86880 COOMBS TEST DIRECT: CPT

## 2023-07-30 PROCEDURE — 82565 ASSAY OF CREATININE: CPT

## 2023-07-30 PROCEDURE — 86644 CMV ANTIBODY: CPT

## 2023-07-30 PROCEDURE — 85060 BLOOD SMEAR INTERPRETATION: CPT | Performed by: STUDENT IN AN ORGANIZED HEALTH CARE EDUCATION/TRAINING PROGRAM

## 2023-07-30 PROCEDURE — G0378 HOSPITAL OBSERVATION PER HR: HCPCS

## 2023-07-30 PROCEDURE — 93306 TTE W/DOPPLER COMPLETE: CPT

## 2023-07-30 PROCEDURE — 84443 ASSAY THYROID STIM HORMONE: CPT

## 2023-07-30 PROCEDURE — 99232 SBSQ HOSP IP/OBS MODERATE 35: CPT | Mod: GC | Performed by: STUDENT IN AN ORGANIZED HEALTH CARE EDUCATION/TRAINING PROGRAM

## 2023-07-30 PROCEDURE — 85652 RBC SED RATE AUTOMATED: CPT

## 2023-07-30 PROCEDURE — 85730 THROMBOPLASTIN TIME PARTIAL: CPT

## 2023-07-30 PROCEDURE — 86645 CMV ANTIBODY IGM: CPT

## 2023-07-30 PROCEDURE — 86663 EPSTEIN-BARR ANTIBODY: CPT

## 2023-07-30 PROCEDURE — 86665 EPSTEIN-BARR CAPSID VCA: CPT

## 2023-07-30 PROCEDURE — 83615 LACTATE (LD) (LDH) ENZYME: CPT

## 2023-07-30 PROCEDURE — 258N000003 HC RX IP 258 OP 636

## 2023-07-30 PROCEDURE — 80053 COMPREHEN METABOLIC PANEL: CPT

## 2023-07-30 PROCEDURE — 250N000011 HC RX IP 250 OP 636: Mod: JZ

## 2023-07-30 PROCEDURE — 36415 COLL VENOUS BLD VENIPUNCTURE: CPT

## 2023-07-30 PROCEDURE — 99222 1ST HOSP IP/OBS MODERATE 55: CPT | Mod: GC | Performed by: STUDENT IN AN ORGANIZED HEALTH CARE EDUCATION/TRAINING PROGRAM

## 2023-07-30 PROCEDURE — 93306 TTE W/DOPPLER COMPLETE: CPT | Mod: 26 | Performed by: INTERNAL MEDICINE

## 2023-07-30 PROCEDURE — 86225 DNA ANTIBODY NATIVE: CPT

## 2023-07-30 PROCEDURE — 86160 COMPLEMENT ANTIGEN: CPT

## 2023-07-30 PROCEDURE — 86140 C-REACTIVE PROTEIN: CPT

## 2023-07-30 PROCEDURE — 85390 FIBRINOLYSINS SCREEN I&R: CPT | Mod: 26 | Performed by: PATHOLOGY

## 2023-07-30 PROCEDURE — 86780 TREPONEMA PALLIDUM: CPT

## 2023-07-30 PROCEDURE — 85613 RUSSELL VIPER VENOM DILUTED: CPT

## 2023-07-30 PROCEDURE — 96372 THER/PROPH/DIAG INJ SC/IM: CPT

## 2023-07-30 PROCEDURE — 87389 HIV-1 AG W/HIV-1&-2 AB AG IA: CPT

## 2023-07-30 PROCEDURE — 85025 COMPLETE CBC W/AUTO DIFF WBC: CPT

## 2023-07-30 PROCEDURE — 85045 AUTOMATED RETICULOCYTE COUNT: CPT

## 2023-07-30 PROCEDURE — 82248 BILIRUBIN DIRECT: CPT

## 2023-07-30 RX ORDER — HYDROXYZINE HYDROCHLORIDE 25 MG/1
25 TABLET, FILM COATED ORAL
Status: DISCONTINUED | OUTPATIENT
Start: 2023-07-30 | End: 2023-07-31 | Stop reason: HOSPADM

## 2023-07-30 RX ORDER — TRIAMCINOLONE ACETONIDE 1 MG/G
OINTMENT TOPICAL 2 TIMES DAILY
Status: DISCONTINUED | OUTPATIENT
Start: 2023-07-30 | End: 2023-07-31 | Stop reason: HOSPADM

## 2023-07-30 RX ORDER — ENOXAPARIN SODIUM 100 MG/ML
40 INJECTION SUBCUTANEOUS EVERY 24 HOURS
Status: DISCONTINUED | OUTPATIENT
Start: 2023-07-30 | End: 2023-07-31 | Stop reason: HOSPADM

## 2023-07-30 RX ORDER — MESALAMINE 1.2 G/1
4.8 TABLET, DELAYED RELEASE ORAL DAILY
COMMUNITY
Start: 2023-07-13

## 2023-07-30 RX ORDER — DIPHENHYDRAMINE HYDROCHLORIDE, ZINC ACETATE 2; .1 G/100G; G/100G
CREAM TOPICAL 3 TIMES DAILY PRN
Status: DISCONTINUED | OUTPATIENT
Start: 2023-07-30 | End: 2023-07-31 | Stop reason: HOSPADM

## 2023-07-30 RX ORDER — MESALAMINE 1.2 G/1
4.8 TABLET, DELAYED RELEASE ORAL
Status: DISCONTINUED | OUTPATIENT
Start: 2023-07-30 | End: 2023-07-31

## 2023-07-30 RX ORDER — ACETAMINOPHEN 325 MG/1
975 TABLET ORAL 4 TIMES DAILY PRN
Status: DISCONTINUED | OUTPATIENT
Start: 2023-07-30 | End: 2023-07-31 | Stop reason: HOSPADM

## 2023-07-30 RX ORDER — CETIRIZINE HYDROCHLORIDE 10 MG/1
10 TABLET ORAL
Status: DISCONTINUED | OUTPATIENT
Start: 2023-07-30 | End: 2023-07-31 | Stop reason: HOSPADM

## 2023-07-30 RX ORDER — DOXYCYCLINE 100 MG/1
100 CAPSULE ORAL 2 TIMES DAILY
Status: ON HOLD | COMMUNITY
Start: 2023-07-26 | End: 2023-07-31

## 2023-07-30 RX ORDER — CYCLOBENZAPRINE HCL 5 MG
10 TABLET ORAL EVERY 8 HOURS PRN
Status: DISCONTINUED | OUTPATIENT
Start: 2023-07-30 | End: 2023-07-31 | Stop reason: HOSPADM

## 2023-07-30 RX ORDER — ACETAMINOPHEN 325 MG/1
325 TABLET ORAL 4 TIMES DAILY
Status: DISCONTINUED | OUTPATIENT
Start: 2023-07-30 | End: 2023-07-31 | Stop reason: HOSPADM

## 2023-07-30 RX ORDER — ACETAMINOPHEN 325 MG/1
975 TABLET ORAL 4 TIMES DAILY
Status: DISCONTINUED | OUTPATIENT
Start: 2023-07-30 | End: 2023-07-30

## 2023-07-30 RX ADMIN — SODIUM CHLORIDE, POTASSIUM CHLORIDE, SODIUM LACTATE AND CALCIUM CHLORIDE 1000 ML: 600; 310; 30; 20 INJECTION, SOLUTION INTRAVENOUS at 09:44

## 2023-07-30 RX ADMIN — DIPHENHYDRAMINE HYDROCHLORIDE, ZINC ACETATE: 2; .1 CREAM TOPICAL at 13:26

## 2023-07-30 RX ADMIN — HYDROXYZINE HYDROCHLORIDE 25 MG: 25 TABLET, FILM COATED ORAL at 13:25

## 2023-07-30 RX ADMIN — ACETAMINOPHEN 975 MG: 325 TABLET, FILM COATED ORAL at 05:54

## 2023-07-30 RX ADMIN — ACETAMINOPHEN 325 MG: 325 TABLET, FILM COATED ORAL at 16:06

## 2023-07-30 RX ADMIN — HYDROXYZINE HYDROCHLORIDE 50 MG: 50 TABLET ORAL at 20:21

## 2023-07-30 RX ADMIN — ACETAMINOPHEN 325 MG: 325 TABLET, FILM COATED ORAL at 12:48

## 2023-07-30 RX ADMIN — ACETAMINOPHEN 325 MG: 325 TABLET, FILM COATED ORAL at 20:21

## 2023-07-30 RX ADMIN — ENOXAPARIN SODIUM 40 MG: 40 INJECTION SUBCUTANEOUS at 12:49

## 2023-07-30 RX ADMIN — TRIAMCINOLONE ACETONIDE: 1 OINTMENT TOPICAL at 20:22

## 2023-07-30 RX ADMIN — CYCLOBENZAPRINE HYDROCHLORIDE 10 MG: 5 TABLET, FILM COATED ORAL at 09:31

## 2023-07-30 ASSESSMENT — ACTIVITIES OF DAILY LIVING (ADL)
DIFFICULTY_EATING/SWALLOWING: NO
ADLS_ACUITY_SCORE: 18
WALKING_OR_CLIMBING_STAIRS_DIFFICULTY: NO
CONCENTRATING,_REMEMBERING_OR_MAKING_DECISIONS_DIFFICULTY: NO
ADLS_ACUITY_SCORE: 18
ADLS_ACUITY_SCORE: 18
NUMBER_OF_TIMES_PATIENT_HAS_FALLEN_WITHIN_LAST_SIX_MONTHS: 2
ADLS_ACUITY_SCORE: 18
CHANGE_IN_FUNCTIONAL_STATUS_SINCE_ONSET_OF_CURRENT_ILLNESS/INJURY: NO
DIFFICULTY_COMMUNICATING: NO
DOING_ERRANDS_INDEPENDENTLY_DIFFICULTY: NO
TOILETING_ISSUES: NO
ADLS_ACUITY_SCORE: 18
FALL_HISTORY_WITHIN_LAST_SIX_MONTHS: YES
ADLS_ACUITY_SCORE: 18
HEARING_DIFFICULTY_OR_DEAF: NO
WEAR_GLASSES_OR_BLIND: NO
ADLS_ACUITY_SCORE: 18
DRESSING/BATHING_DIFFICULTY: NO
ADLS_ACUITY_SCORE: 18
ADLS_ACUITY_SCORE: 18

## 2023-07-30 NOTE — CONSULTS
"[facility] Inpatient Consult Dermatology Note    Impression/Plan:    # Morbilliform eruption  Overall clinical impression most suggestive of a simple exanthematous drug eruption, possibly 2/2 cephalosporins or Bactrim (7/20-26), or potentially CT contrast (7/20). Differential diagnosis includes a viral exanthem. There is very low concern for DRESS given no eosinophilia and CMP wnl; additionally the drug timeline is likely too early for DRESS to develop. Agree with broad infectious and autoimmune work-up given patient's history and somewhat complex presentation, although there is less suspicion that patient's rash is related at this time. Acute cutaneous lupus typically presents as a localized facial rash & less likely a generalized eruption involving mostly sun-exposed skin.      A skin biopsy is not routinely necessary for the diagnosis of exanthematous drug eruption. The histopathologic findings (vacuolar interface dermatitis and tissue eosinophilia) may support the diagnosis but are nonspecific. Prompt withdrawal of the offending drug is the mainstay of treatment for exanthematous drug eruptions. It may take an additional 1-2 weeks after stopping the medication before the eruption completely resolves. When a culprit drug is considered essential for the patient and there are no suitable alternatives, mild or moderate exanthematous eruptions can be \"treated through\" with topical steroids and antihistamines to alleviate itch while the suspected medication is continued.       Recommendations:  - start triamcinolone 0.1% ointment BID for rash on trunk and extremities (can change to cream per patient preference though ointment will be more potent)  - agree with antihistamines PRN for pruritus   - consider checking EBV, CMV, HHV-6 and HHV-7 PCR  - daily CBC with diff and CMP to verify to not progressing to DIHS/DRESS (looking for new/worsening eosinophilia, transaminitis)    Thank you for the " "dermatology consultation. We will continue to follow. Please do not hesitate to contact the dermatology resident/faculty on call for any additional questions or concerns.     Staffed with attending physician, Dr. Zhang Louise MD   Dermatology Resident (PGY-4)    Staff Physician Comments:   I saw and evaluated the patient with the resident and I agree with the assessment and plan. I was present for the examination.    Dwayne Holcomb DO    Department of Dermatology  [facility]: Phone: [phone], Fax:[phone]  [facility]: Phone: [phone], Fax: [phone]      Date of Admission: Jul 29, 2023   Encounter Date: 07/30/2023    Reason for Consultation:   \"pmhx of UC with recent flare on mesalamine; generalized MP rash/small wheel&flare with fever and weight loss\"    History of Present Illness:  Mr. Jose Alejandro Valentine is a 33 year old male with history of ulcerative colitis and multiple recent ED visits for fevers, headaches, and facial rash, who presented to [facility] ED 7/29 after syncope and new widespread rash, subsequently transferred to [facility] for further evaluation of systemic diseases.     Patient first presented to [facility] ED on 7/20/23 for nose pain/swelling, headaches, fever/chills x 4 days, for which he was diagnosed with nasal vestibulitis vs cellulitis and prescribed Keflex and Bactrim. He was seen again in the ED 2 days later on 7/22 for headaches and neck stiffness and discharged after IV Toradol with suspicion for tension headache. He returned to the ED on 7/26 with persistent fevers, worsening pain and redness over his nose and spreading to his cheeks, thought to be due to erysipelas vs cellulitis, and he was discharged with 10-day course of doxycycline.    Yesterday 7/29 patient returned to the ED for the fourth time after 2 episodes of syncope at home. The facial " "rash was possibly slightly improved but now he developed a diffuse itchy rash on his trunk and extremities that started on Friday. Patient was subsequently transferred to [facility] given concern for autoimmune process.    Today on encounter, patient reports the facial redness and pain has significantly improved with the antibiotics he received. However the new rash on his body and arms/legs started Friday and he believes this is a new separate thing going on. No prior history of skin rashes. Reports he's had CT scans in the past and didn't have a reaction to contrast previously. Endorses pruritus. Regarding his ulcerative colitis, reports he had a flare in May but that has since been well-controlled since restarting his mesalamine.      Past Medical History:   Reviewed in epic, pertinent findings summarized as above    Social History:  Patient reports that he has quit smoking. He has never used smokeless tobacco.    Family History:  No family history on file.    Medications:  Reviewed in epic, pertinent findings summarized as above     No Known Allergies    Review of Systems:  As per HPI.     Physical exam:  Vitals: /71 (BP Location: Left arm)   Pulse 70   Temp 98.6  F (37  C) (Oral)   Resp 14   Ht 1.88 m (6' 2\")   Wt 95.2 kg (209 lb 14.4 oz)   SpO2 98%   BMI 26.95 kg/m    GEN: This is a well developed, well-nourished male in no acute distress, in a pleasant mood.    SKIN: Focused examination of the head/neck, trunk, arms, legs was performed.  - Block skin type: I  - Forehead, cheeks, and nose with diffuse erythema  - Chest, back, arms, and legs with erythematous macules coalescing into confluent patches  - There is no lymphadenopathy on palpation of cervical and axillary lymph nodes  - No other lesions of concern on areas examined.                         Laboratory:  Reviewed in epic, pertinent findings summarized as above    Staff Involved:  Resident/Staff            "

## 2023-07-30 NOTE — CONSULTS
[facility] Outpatient Rheumatology Consultation  Date of service: July 30, 2023    Patient name: Jose Alejandro Valentine  YOB: 1990  MRN: [id number]    Reason for consult: Rash, fever, weight loss, neck stiffness    HPI: 32 yo male with a pmh of ulcerative colitis on mesalamine was admitted 7/29/23 for generalized rash, weight loss and neck stiffness and syncopal episodes.     As per patient, symptoms started 2 weeks ago 7/20 with fevers and nasal stuffiness. He denied any runny nose, cough, sore throat. He then went to the ED and was given bactrim and keflex for 7 days. Towards the end of his antibiotic course patient started experiencing diffuse headaches and neck pain and he sought care again at ED 7/26 and was given doxycycline. Patient mentioned that he then developed rash 7/28, pruritic which he first noticed on his shoulders but later noted on his trunk and distal hands as well, sparing the palms. He then had 2 episodes of loss of consciousness while at home, unknown downtime and patient stays by himself. After these episodes he presented to the ED for this admission. He did mention that he has had diarrhea after initiating antibiotics. Otherwise ROS as below.    Father has history of psoriasis. Patient has UC which is in remission. Mesalamine was reinstated after a flare up 4/2023 as per patient, last colonoscopy 4/2023. Denies any travel, treks, h/o STDs or IVDU.     Rheumatology review of systems: Diffuse headache +.  No new hair loss.  No change in vision or hearing.  No inflammatory eye disease history. No history of blood clots.  No dry eyes or dry mouth. No photosensitive rash. No nasal or oral ulcers.  No recurrent epistaxis or hemoptysis.  No recurrent pneumonia.  No chest pain or shortness of breath.  No abdominal pain, constipation, diarrhea.  No blood in stool or black tarry stools.  No vomiting.  No foamy or frothy urine.  No bowel or bladder incontinence.  No change in strength or  "sensation in the arms or legs.  No pitting or brittle nails.  No triphasic color change consistent with Raynaud's.  No skin thickening or tightening consistent with sclerodactyly.  No symptoms consistent with dactylitis.  No red hot or swollen joints.  No joint pain.  No joint stiffness.  No muscle pain.  No genital ulcers or lesions.     Past Medical History:  Past Medical History:   Diagnosis Date    Ulcerative proctitis (H)        Past Surgical History:  No past surgical history on file.    Medications:  Current Facility-Administered Medications   Medication    acetaminophen (TYLENOL) tablet 975 mg    cyclobenzaprine (FLEXERIL) tablet 10 mg    hydrOXYzine (ATARAX) tablet 25 mg    Or    hydrOXYzine (ATARAX) tablet 50 mg    lactated ringers BOLUS 1,000 mL    lidocaine (LMX4) cream    lidocaine 1 % 0.1-1 mL    melatonin tablet 1 mg    [Held by provider] mesalamine (LIALDA) DR tablet 4.8 g    sodium chloride (PF) 0.9% PF flush 3 mL    sodium chloride (PF) 0.9% PF flush 3 mL       Allergies:  No Known Allergies    Family history:  Psoriasis in father    Social History:  ETOH: Former; sober for 2 years  Smoking: Former  Drug use: denies iv drug use  Occupation: At [employer], manager    Objective:  /71 (BP Location: Left arm)   Pulse 70   Temp 98.6  F (37  C) (Oral)   Resp 14   Ht 1.88 m (6' 2\")   Wt 95.2 kg (209 lb 14.4 oz)   SpO2 98%   BMI 26.95 kg/m    GEN: sitting up unassisted, NAD  HEENT: sclera clear, no oral or nasal ulcers, no inflammatory nasal bridge/external ear changes, good saliva pool. Lymphadenopathy at bilateral neck; tender  CV: RRR, no m/r/g  Pulm: CTAB no crackles wheezing ronchi  Abdomen: soft, non tender, not distended, no masses  Extremities: Full active and passive ROM of bilateral shoulders, elbows, wrists, MCPs, PIPs, DIPs, hips, knees, ankles. Able to make a full fist with good strength bilaterally. No synovitis of any joints. No dactylitis. No digital pitting. No nail changes. No " sclerodactyly  Skin: Blanchable maculopapular rash over trunk, back, b/l upper and lower extremities sparing the palms and soles    WBC   Date Value Ref Range Status   10/25/2019 7.1 4.0 - 11.0 10e9/L Final     WBC Count   Date Value Ref Range Status   07/30/2023 4.9 4.0 - 11.0 10e3/uL Final     Hemoglobin   Date Value Ref Range Status   07/30/2023 14.7 13.3 - 17.7 g/dL Final   10/25/2019 12.9 (L) 13.3 - 17.7 g/dL Final     Platelet Count   Date Value Ref Range Status   07/30/2023 175 150 - 450 10e3/uL Final   10/25/2019 244 150 - 450 10e9/L Final     Creatinine   Date Value Ref Range Status   07/30/2023 1.06 0.67 - 1.17 mg/dL Final     Lab Results   Component Value Date    ALKPHOS 59 07/30/2023    ALKPHOS 84 10/25/2019     AST   Date Value Ref Range Status   07/30/2023 28 0 - 45 U/L Final     Comment:     Reference intervals for this test were updated on 6/12/2023 to more accurately reflect our healthy population. There may be differences in the flagging of prior results with similar values performed with this method. Interpretation of those prior results can be made in the context of the updated reference intervals.   10/25/2019 25 0 - 45 U/L Final     Lab Results   Component Value Date    ALT 20 07/30/2023    ALT 30 10/25/2019     Sed Rate   Date Value Ref Range Status   10/25/2019 9 0 - 15 mm/h Final     Erythrocyte Sedimentation Rate   Date Value Ref Range Status   07/30/2023 30 (H) 0 - 15 mm/hr Final     CRP Inflammation   Date Value Ref Range Status   10/25/2019 3.0 0.0 - 8.0 mg/L Final     UA RESULTS:  Recent Labs   Lab Test 07/29/23 2009   COLOR Straw   APPEARANCE Clear   URINEGLC Negative   URINEBILI Negative   URINEKETONE Negative   SG 1.005   UBLD Negative   URINEPH 5.5   PROTEIN Negative   NITRITE Negative   LEUKEST Negative   RBCU 1   WBCU 2      No results found for: ANAIGG, ANAP1, TIERNEY, DNA, ENASMI, RNPIGG, ENASSA, ENASSB, C3COM, C4COM, CKTOTAL, ALDOLASE, RHF, CCPIGG, ANCA, MPOIGG, PR3IGG    LAUREN,  RPR, c3, c4, dsDNA, ant Sm, lupus anticoagulant ordered by primary team-- pending    CRP 43 (up since 7/27 8.5mg/dl)  Sed rate 30 (down since 7/29 40 at [facility])  TB spot negative 2019  Hep B surface antigen negative 2019    Imaging  CT maxillofaxial 7/20  1. The nose is unremarkable in appearance by CT with no drainable fluid collections identified.   2. Bilateral jugular adenopathy seen.     CTH 7/29  No acute intracranial hemorrhage or extraaxial collection. No evidence of acute cortical infarction. No mass effect or midline shift. The ventricles and sulci are age appropriate. Orbital contents are normal.  No calvarial fractures. No lytic or sclerotic osseous lesions within the calvarium or skull base. Scalp and other imaged soft tissue structures are normal. Mastoid air cells are clear.     A/P  #Morbilliform rash- likely secondary to recent antibiotics; low suspicion for autoimmune conditions including vasculitis or lupus   #Recent URI / viral sinusitis  #Submandibular and jugular tender lymphadenopathy  #H/o Ulcerative colitis with proctitis on mesalamine  - Neck pain seems to be due to LAD; no midline neck tenderness; tender LAD on bilateral lateral neck-- agree with infectious work up (HIV, EBV, CMV, RPR)  -Will await results of immunology work up sent per primary team; no additional tests from our end due to low suspicion for autoimmune process    Sushma Luna  Rheumatology  Discussed with Dr Brooks

## 2023-07-30 NOTE — PLAN OF CARE
Major Shift Events: Assumed care of patient from [id number]. AVSS on RA. Scheduled Tylenol given for bilateral neck pain. Rash present on bilateral arms, chest, abdomen, and back, patient reports rash is itchy. Regular diet, having sips of water. Up w/ SBA in room due to being connected to monitors, steady on his feet. Voiding spont. UA sent. PIV SL.  Plan: Dermatology and rheumatology consults ordered per provider. Cont w/ POC.  For vital signs and complete assessments, please see documentation flowsheets.

## 2023-07-30 NOTE — PROGRESS NOTES
Neuro: A&Ox4.   Cardiac: SR. VSS. ECHO normal. No syncopal episodes today.   Respiratory: Sating 99 on RA.  GI/: Adequate urine output. BM X1  Diet/appetite: Tolerating regular diet. Eating well.  Activity:  Independent up to chair and in halls.  Pain: At acceptable level on current regimen.   Skin: No new deficits noted.  LDA's: Right AC PIV    Plan: Dermatology and Rheumatology saw patient today. Many blood tests being run at this time. Continue with POC. Notify primary team with changes.

## 2023-07-30 NOTE — PHARMACY-ADMISSION MEDICATION HISTORY
Pharmacist Admission Medication History    Admission medication history is complete. The information provided in this note is only as accurate as the sources available at the time of the update.    Medication reconciliation/reorder completed by provider prior to medication history? Yes    Information Source(s): Patient and CareEverywhere/SureScripts via in-person    Pertinent Information:   Was prescribed cephalexin and Bactrim (filled 7/21/23).  This was switched to doxycycline 7/26    Changes made to PTA medication list:  Added:  mesalamine, doxycycline  Deleted: multivitamin  Changed: None      Allergies reviewed with patient and updates made in EHR: yes    Medication History Completed By: Laura Taylor RPH 7/30/2023 2:00 PM    Prior to Admission medications    Medication Sig Last Dose Taking? Auth Provider Long Term End Date   doxycycline hyclate (VIBRAMYCIN) 100 MG capsule Take 100 mg by mouth 2 times daily 7/28/2023 at pm Yes Unknown, Entered By History  8/5/23   mesalamine (LIALDA) 1.2 g DR tablet Take 4.8 g by mouth daily 7/28/2023 at am Yes Unknown, Entered By History

## 2023-07-30 NOTE — UTILIZATION REVIEW
"   Admission Status; Secondary Review Determination         Under the authority of the Utilization Management Committee, the utilization review process indicated a secondary review on the above patient.  The review outcome is based on review of the medical records, discussions with staff, and applying clinical experience noted on the date of the review.          (x) Observation Status Appropriate - This patient does not meet hospital inpatient criteria and is placed in observation status. If this patient's primary payer is Medicare and was admitted as an inpatient, Condition Code 44 should be used and patient status changed to \"observation\".     RATIONALE FOR DETERMINATION   33-year-old male with a history of ulcerative colitis presented with a progressive generalized maculopapular rash (MP rash), fever, weight loss, and neck and back stiffness for 2 weeks. Potential etiologies of infection/inflammation, malignancy, drug-induced, metabolic, and autoimmune causes were considered. Viral infections, including EBV, CMV, and HIV, were assessed with antibody testing. Peripheral blood smear and LDH were ordered to evaluate for hematologic malignancies. Autoimmune conditions such as SLE and dermatomyositis were also considered, and the patient was referred to the rheumatology team for further evaluation. The patient experienced two episodes of transient loss of consciousness with fecal incontinence, which may indicate seizure activity. Orthostatic or reflex-mediated syncope was also considered due to potential hypovolemia from decreased oral intake and diarrhea. Cardiogenic causes were less likely, but telemetry was initiated for monitoring. A non-contrast CT brain showed no abnormalities.  Given the patient's relatively stable vital signs, and the lack of any indication for immediate intervention or severe complications, a short hospitalization allows for further observation, diagnostic workup, and optimization of " management plans under controlled settings, while avoiding unnecessary prolonged inpatient stay.  The severity of illness, intensity of service provided, expected LOS and risk for adverse outcome make the care appropriate for further observation; however, doesn't meet criteria for hospital inpatient admission. Dr Acuna  notified of this determination.    This document was produced using voice recognition software.      The information on this document is developed by the utilization review team in order for the business office to ensure compliance.  This only denotes the appropriateness of proper admission status and does not reflect the quality of care rendered.         The definitions of Inpatient Status and Observation Status used in making the determination above are those provided in the CMS Coverage Manual, Chapter 1 and Chapter 6, section 70.4.      Sincerely,     SANDEE SHEN MD    System Medical Director  Utilization Management  [facility].

## 2023-07-30 NOTE — PLAN OF CARE
ICU End of Shift Summary. See flowsheets for vital signs and detailed assessment.    Changes this shift: A+O, moderate neck pain only slightly relieved by tylenol. VSS. Voided x1.    Plan: derm and rheum consults

## 2023-07-30 NOTE — PROGRESS NOTES
[facility] [place]    Progress Note - Medicine Service, [facility] TEAM 3       Date of Admission:  7/29/2023    Assessment & Plan   Jose Alejandro Valentine is a 33 year old male with past medical history of ulcerative colitis with recent flare (May 2023) on mesalamine who was admitted on 7/30/23 with ten days of progressive cephalocaudal rash, fevers, and weight loss, as well as one day of syncope x2.    Changes today:  - Broad workup (viral and autoimmune)  - Started triamcinolone 0.1% ointment BID   - PRN antihistamines for ongoing pruritus  - Consult Rheumatology  - Consult Dermatology  - Telemetry, EKG, Echo    Progressive cephalocaudal morbilliform rash  On 7/20, presented with swelling on tip of nose and fevers (101-102) and treated for nasal vestibulitis with keflex and bactrim. On 7/24, presented with rash on bridge of nose and cheeks and treated for facial cellulitis with doxycycline (10 day course). On 7/28, noticed progression of rash extending down trunk, upper extremities, and thighs. On 7/30, he had two episodes of sudden onset syncope. Rash and syncope accompanied by ongoing subjective fevers, neck pain, and weight loss. Differential includes drug-induced, rheumatologic, or infectious rash.  Elevated inflammatory markers upon admission (ESR 30, CRP 43, ). Broad infectious and autoimmune work-up pending. Derm and rheum consulted.  Derm suspects drug-induced reaction to antibiotics or IV contrast, with recommendation to start triamcinolone ointment BID.  - Broad workup pending (HIV, HSV-6, unable to order HSV-7, Parvovirus B6, CMV, EBV, RPR, LAUREN, Anti-ds, Anti-Smith, Jose Elias, Lupus anticoagulant, C3, C4)  - Daily CBC w/diff, CMP  - Started triamcinolone 0.1% ointment BID   - PRN antihistamines for ongoing pruritus  - Consult Rheumatology  - Consult Dermatology    Syncope  On the morning of 7/30, had two episodes of syncope. First syncope presented as sudden onset of  "nausea and vertigo (room spinning), after which he found himself face-up on the floor of his kitchen with incontinence and mild confusion. He got up and walked around before having second episode of syncope and found himself face down on the floor of his kitchen. Both episodes were unwitnessed. He did not bite his tongue after either episode; no bumps on his head, blurry vision, or worsening headache. No recent chest pain or palpitations; no history of syncope; no family history of syncope, seizures, or sudden death. Head CT without bleed or ischemia. Etiology may be orthostatic/vasovagal in setting of poor oral intake. Telemetry with one day of NSR. Echo normal cardiac function; no pericardial effusion or thickening.   - Telemetry  - EKG  - Echo    Acute Kidney Injury   Baseline creatinine ~1. Admitted with Cr 1.5. Normal UA. Likely pre-renal given history of decrease in appetite with fever (insensible losses).  - Follow BMP  - 1L LR bolus    Ulcerative colitis   One month of blood diarrhea starting in late May, with colonoscopy on 06/27 which demonstrated UC proctitis. Started mesalamine 4.8g (06/27), with subsequent good control of UC symptoms. No history of side effects with mesalamine. Given rash and fevers, will hold mesalamine pending derm and rheum input.   - Hold PTA mesalamine  - Hold NSAIDs with concern for UC flare  - Lovenox dvt ppx       Diet: Combination Diet Regular Diet Adult    DVT Prophylaxis: Enoxaparin (Lovenox) SQ  Panda Catheter: Not present  Fluids: 1L LR  Lines: None     Cardiac Monitoring: ACTIVE order. Indication: syncope  Code Status: Full Code      Clinically Significant Risk Factors Present on Admission                       # Overweight: Estimated body mass index is 26.95 kg/m  as calculated from the following:    Height as of this encounter: 1.88 m (6' 2\").    Weight as of this encounter: 95.2 kg (209 lb 14.4 oz).            Disposition Plan        The patient's care was discussed " with the Attending Physician, Dr. Holder .    Nieves Acuna MD  Medicine Service, [facility] TEAM 3  [facility]  See signed in provider for up to date coverage information  ______________________________________________________________________    Interval History   Denies chest pain, palpitations, pain with deep breaths. Denies pain over areas of rash; endorses ongoing erythema pruritus. Denies recent sick contacts, travel, new lotions or perfumes. Notes recent move to apartment following divorce from spouse.    Physical Exam   Vital Signs: Temp: 98.6  F (37  C) Temp src: Oral BP: 119/74 Pulse: 73   Resp: 11 SpO2: 100 % O2 Device: None (Room air)    Weight: 209 lbs 14.4 oz    Constitutional: awake, alert, cooperative, no apparent distress  Respiratory: No increased work of breathing, good air exchange, clear to auscultation bilaterally, no crackles or wheezing  Cardiovascular: RRR, no murmurs, rubs, or gallops, no peripheral edema  GI: Normal bowel sounds, non-distended, non-tender  Skin: Malar rash, rash over shoulders extending to upper extremities, chest, abdomen and pack. No rash on palms of hands.  Musculoskeletal: no lower extremity pitting edema present  there is no redness, warmth, or swelling of the joints  Neurologic: AAOx3, cranial nerves grossly intact, no focal deficits.    Medical Decision Making       Please see A&P for additional details of medical decision making.      Data   Most Recent 3 CBC's:  Recent Labs   Lab Test 07/30/23  0629 07/30/23  0055 10/25/19  0907   WBC 4.9 4.8 7.1   HGB 14.7 13.8 12.9*   MCV 91 92 76*    189 244     Most Recent 3 BMP's:  Recent Labs   Lab Test 07/30/23  1152 07/30/23  0630   NA  --  136   POTASSIUM  --  4.3   CHLORIDE  --  104   CO2  --  19*   BUN  --  12.7   CR 0.98 1.06   ANIONGAP  --  13   GLORIA  --  9.2   GLC  --  94     Most Recent TSH and T4:  Recent Labs   Lab Test 07/30/23  0630   TSH 2.65     Most Recent ESR &  CRP:  Recent Labs   Lab Test 07/30/23  0630 07/30/23  0629 10/25/19  0907   SED  --  30* 9   CRP  --   --  3.0   CRPI 43.00*  --   --

## 2023-07-31 VITALS
OXYGEN SATURATION: 100 % | RESPIRATION RATE: 10 BRPM | WEIGHT: 209.9 LBS | BODY MASS INDEX: 26.94 KG/M2 | DIASTOLIC BLOOD PRESSURE: 73 MMHG | TEMPERATURE: 98.4 F | HEART RATE: 67 BPM | HEIGHT: 74 IN | SYSTOLIC BLOOD PRESSURE: 134 MMHG

## 2023-07-31 LAB
ALBUMIN SERPL BCG-MCNC: 4 G/DL (ref 3.5–5.2)
ALP SERPL-CCNC: 58 U/L (ref 40–129)
ALT SERPL W P-5'-P-CCNC: 22 U/L (ref 0–70)
ANION GAP SERPL CALCULATED.3IONS-SCNC: 9 MMOL/L (ref 7–15)
AST SERPL W P-5'-P-CCNC: 25 U/L (ref 0–45)
BASOPHILS # BLD AUTO: 0 10E3/UL (ref 0–0.2)
BASOPHILS NFR BLD AUTO: 0 %
BILIRUB SERPL-MCNC: 0.3 MG/DL
BUN SERPL-MCNC: 8.6 MG/DL (ref 6–20)
C3 SERPL-MCNC: 172 MG/DL (ref 81–157)
C4 SERPL-MCNC: 33 MG/DL (ref 13–39)
CALCIUM SERPL-MCNC: 9 MG/DL (ref 8.6–10)
CHLORIDE SERPL-SCNC: 104 MMOL/L (ref 98–107)
CMV IGG SERPL IA-ACNC: >10 U/ML
CMV IGG SERPL IA-ACNC: ABNORMAL
CMV IGM SERPL IA-ACNC: <8 AU/ML
CMV IGM SERPL IA-ACNC: NEGATIVE
CREAT SERPL-MCNC: 1.11 MG/DL (ref 0.67–1.17)
DEPRECATED HCO3 PLAS-SCNC: 25 MMOL/L (ref 22–29)
DRVVT CONFIRM NORMALIZED RATIO: 1.33
DRVVT SCREEN RATIO: 1.29
DSDNA AB SER-ACNC: 3.7 IU/ML
EBV EA-D IGG SER-ACNC: 26.5 U/ML (ref 0–9)
EBV EA-D IGG SER-ACNC: POSITIVE
EBV VCA IGM SER IA-ACNC: 12.2 U/ML
EBV VCA IGM SER IA-ACNC: NORMAL
ENA SM IGG SER IA-ACNC: 1.3 U/ML
ENA SM IGG SER IA-ACNC: NEGATIVE
EOSINOPHIL # BLD AUTO: 0.1 10E3/UL (ref 0–0.7)
EOSINOPHIL NFR BLD AUTO: 3 %
ERYTHROCYTE [DISTWIDTH] IN BLOOD BY AUTOMATED COUNT: 12.5 % (ref 10–15)
GFR SERPL CREATININE-BSD FRML MDRD: 90 ML/MIN/1.73M2
GLUCOSE SERPL-MCNC: 104 MG/DL (ref 70–99)
HCT VFR BLD AUTO: 42.8 % (ref 40–53)
HGB BLD-MCNC: 14 G/DL (ref 13.3–17.7)
HIV 1+2 AB+HIV1 P24 AG SERPL QL IA: NONREACTIVE
IMM GRANULOCYTES # BLD: 0 10E3/UL
IMM GRANULOCYTES NFR BLD: 0 %
INR PPP: 1.06 (ref 0.85–1.15)
LA PPP-IMP: POSITIVE
LUPUS INTERPRETATION: ABNORMAL
LYMPHOCYTES # BLD AUTO: 1.4 10E3/UL (ref 0.8–5.3)
LYMPHOCYTES NFR BLD AUTO: 35 %
MCH RBC QN AUTO: 30.2 PG (ref 26.5–33)
MCHC RBC AUTO-ENTMCNC: 32.7 G/DL (ref 31.5–36.5)
MCV RBC AUTO: 92 FL (ref 78–100)
MONOCYTES # BLD AUTO: 0.3 10E3/UL (ref 0–1.3)
MONOCYTES NFR BLD AUTO: 9 %
NEUTROPHILS # BLD AUTO: 2.1 10E3/UL (ref 1.6–8.3)
NEUTROPHILS NFR BLD AUTO: 53 %
NRBC # BLD AUTO: 0 10E3/UL
NRBC BLD AUTO-RTO: 0 /100
PLAT MORPH BLD: NORMAL
PLATELET # BLD AUTO: 176 10E3/UL (ref 150–450)
POTASSIUM SERPL-SCNC: 3.9 MMOL/L (ref 3.4–5.3)
PROT SERPL-MCNC: 7.4 G/DL (ref 6.4–8.3)
PTT RATIO: 1.16
RBC # BLD AUTO: 4.64 10E6/UL (ref 4.4–5.9)
RBC MORPH BLD: NORMAL
SODIUM SERPL-SCNC: 138 MMOL/L (ref 136–145)
T PALLIDUM AB SER QL: NONREACTIVE
THROMBIN TIME: 17.8 SECONDS (ref 13–19)
WBC # BLD AUTO: 3.9 10E3/UL (ref 4–11)

## 2023-07-31 PROCEDURE — 87533 HHV-6 DNA QUANT: CPT

## 2023-07-31 PROCEDURE — 36415 COLL VENOUS BLD VENIPUNCTURE: CPT

## 2023-07-31 PROCEDURE — 250N000013 HC RX MED GY IP 250 OP 250 PS 637

## 2023-07-31 PROCEDURE — G0378 HOSPITAL OBSERVATION PER HR: HCPCS

## 2023-07-31 PROCEDURE — 85025 COMPLETE CBC W/AUTO DIFF WBC: CPT

## 2023-07-31 PROCEDURE — 80053 COMPREHEN METABOLIC PANEL: CPT

## 2023-07-31 PROCEDURE — 93005 ELECTROCARDIOGRAM TRACING: CPT

## 2023-07-31 PROCEDURE — 250N000011 HC RX IP 250 OP 636: Mod: JZ

## 2023-07-31 PROCEDURE — 96372 THER/PROPH/DIAG INJ SC/IM: CPT

## 2023-07-31 PROCEDURE — 99239 HOSP IP/OBS DSCHRG MGMT >30: CPT | Mod: GC | Performed by: STUDENT IN AN ORGANIZED HEALTH CARE EDUCATION/TRAINING PROGRAM

## 2023-07-31 RX ORDER — ACETAMINOPHEN 325 MG/1
325 TABLET ORAL 4 TIMES DAILY
Qty: 120 TABLET | Refills: 0 | Status: SHIPPED | OUTPATIENT
Start: 2023-07-31

## 2023-07-31 RX ORDER — ACETAMINOPHEN 325 MG/1
975 TABLET ORAL 4 TIMES DAILY
Status: CANCELLED | OUTPATIENT
Start: 2023-07-31

## 2023-07-31 RX ORDER — DIPHENHYDRAMINE HYDROCHLORIDE, ZINC ACETATE 2; .1 G/100G; G/100G
CREAM TOPICAL 3 TIMES DAILY PRN
Qty: 90 G | Refills: 0 | Status: SHIPPED | OUTPATIENT
Start: 2023-07-31

## 2023-07-31 RX ORDER — HYDROXYZINE HYDROCHLORIDE 25 MG/1
25 TABLET, FILM COATED ORAL EVERY 6 HOURS PRN
Qty: 120 TABLET | Refills: 0 | Status: SHIPPED | OUTPATIENT
Start: 2023-07-31

## 2023-07-31 RX ORDER — TRIAMCINOLONE ACETONIDE 1 MG/G
OINTMENT TOPICAL 2 TIMES DAILY
Qty: 15 G | Refills: 0 | Status: SHIPPED | OUTPATIENT
Start: 2023-07-31

## 2023-07-31 RX ORDER — CETIRIZINE HYDROCHLORIDE 10 MG/1
10 TABLET ORAL
Qty: 30 TABLET | Refills: 1 | Status: SHIPPED | OUTPATIENT
Start: 2023-07-31

## 2023-07-31 RX ORDER — MESALAMINE 1.2 G/1
4.8 TABLET, DELAYED RELEASE ORAL
Status: DISCONTINUED | OUTPATIENT
Start: 2023-07-31 | End: 2023-07-31 | Stop reason: HOSPADM

## 2023-07-31 RX ADMIN — ACETAMINOPHEN 975 MG: 325 TABLET, FILM COATED ORAL at 07:57

## 2023-07-31 RX ADMIN — ACETAMINOPHEN 325 MG: 325 TABLET, FILM COATED ORAL at 11:30

## 2023-07-31 RX ADMIN — TRIAMCINOLONE ACETONIDE: 1 OINTMENT TOPICAL at 07:57

## 2023-07-31 RX ADMIN — MESALAMINE 4.8 G: 1.2 TABLET, DELAYED RELEASE ORAL at 10:13

## 2023-07-31 RX ADMIN — ENOXAPARIN SODIUM 40 MG: 40 INJECTION SUBCUTANEOUS at 11:31

## 2023-07-31 RX ADMIN — CYCLOBENZAPRINE HYDROCHLORIDE 10 MG: 5 TABLET, FILM COATED ORAL at 07:59

## 2023-07-31 ASSESSMENT — ACTIVITIES OF DAILY LIVING (ADL)
ADLS_ACUITY_SCORE: 18

## 2023-07-31 NOTE — PLAN OF CARE
Observation goals   - improvement in rash - complete  - evaluation by dermatology and rheumatology - complete  - completion of labs and tests - pending  - return of vitals to patient baseline - complete    Vitally stable and up independently in the room. Awaiting lab results

## 2023-07-31 NOTE — PROGRESS NOTES
Patient discharged to home at 3:34 PM via ambulation. Accompanied by father and staff. Discharge instructions reviewed with patient, opportunity offered to ask questions. Prescriptions filled and sent with patient upon discharge. All belongings sent with patient.

## 2023-07-31 NOTE — PROGRESS NOTES
"[facility] Inpatient Consult Dermatology Progress Note    Impression/Plan:    # Morbilliform eruption; improving substantively. No active rash  Overall clinical impression most suggestive of a simple exanthematous drug eruption, possibly 2/2 cephalosporins or Bactrim (7/20-26), or potentially CT contrast (7/20). Differential diagnosis includes a viral exanthem. Fortunately rash has nearly resolved and has no liver abnormalities or fevers. Will continue with plan as below.       Recommendations:  - start triamcinolone 0.1% ointment BID for rash on trunk and extremities (can change to cream per patient preference though ointment will be more potent)  - agree with antihistamines PRN for pruritus   - we will send message to have patient set up with allergy consultation in future to ensure has access to all necessary antibiotics in the future    Thank you for the dermatology consultation. We will sign off as of now. Please do not hesitate to contact the dermatology resident/faculty on call for any additional questions or concerns.     Staffed with attending physician, Dr. Lauren Fermin MD   Dermatology Resident (PGY-4)        Date of Admission: Jul 29, 2023   Encounter Date: 07/31/2023    Reason for Consultation:   \"pmhx of UC with recent flare on mesalamine; generalized MP rash/small wheel&flare with fever and weight loss\"    History of Present Illness:  - back on mesalamine  - rash has nearly gone away  - nothing currently bothering him  - using triamcinolone     Past Medical History:   Reviewed in epic, pertinent findings summarized as above    Social History:  Patient reports that he has quit smoking. He has never used smokeless tobacco.    Family History:  No family history on file.    Medications:  Reviewed in epic, pertinent findings summarized as above     No Known Allergies    Review of Systems:  As per HPI.     Physical exam:  Vitals: /73   Pulse 67   Temp 98.4  F (36.9  C) " "(Oral)   Resp 10   Ht 1.88 m (6' 2\")   Wt 95.2 kg (209 lb 14.4 oz)   SpO2 100%   BMI 26.95 kg/m    GEN: This is a well developed, well-nourished male in no acute distress, in a pleasant mood.    SKIN: Focused examination of the head/neck, chest, arms  - No residual erythematous plaques or papules  - No other lesions of concern on areas examined.                         Laboratory:  Reviewed in epic, pertinent findings summarized as above    Staff Involved:  Resident/Staff            "

## 2023-07-31 NOTE — PLAN OF CARE
Goal Outcome Evaluation:  Neuro: A&Ox4.   Cardiac: Vitally stablem afebrile.   Respiratory: Sating above 95%  on RA.  GI/: Adequate urine output.   Diet/appetite: Tolerating regular diet.  Activity:  independent going to bathroom  Pain: At acceptable level on current regimen.   Skin: No new deficits noted. Rash noted on trunk , chest abdomen and arms.   LDA's: 1 PIV in place    Plan: Continue with POC. Notify primary team with changes.

## 2023-07-31 NOTE — DISCHARGE SUMMARY
[facility]    Internal Medicine Discharge Summary- [facility]    Date of Admission:  7/29/2023  Date of Discharge:  7/31/2023  3:30 PM  Discharging Attending Provider: Dr. Holder  Discharge Team: Prakash 3    Discharge Diagnoses   Morbilliform eruption c/f exanthematous drug eruption  Syncope, suspected orthostatic vs vasovagal; resolved  Acute Kidney Injury, suspected prerenal; resolved   Ulcerative colitis; not in acute flare    Follow-ups Needed After Discharge   - Primary care provider: monitor for resolution of rash; follow up pending labs (see below)    Hospital Course   Jose Alejandro Valentine is a 33 year old male with past medical history of ulcerative colitis with recent flare (May 2023) on mesalamine who was admitted on 7/30/23 with ten days of progressive cephalocaudal rash, fevers, and weight loss, as well as syncopal events. Patient was evaluated by dermatology and rheumatology while inpatient. Dermatology believed morbilliform eruption to be 2/2 exanthematous drug eruption. Rheumatology had low suspicion for autoimmune process. Rash nearly resolved at time of discharge. Patient was instructed to follow up closely w/ PCP given pending lab results and to monitor for resolution of rash.     The following problems were addressed during his hospitalization:    Morbilliform eruption c/f exanthematous drug eruption (possible 2/2 cephalosporins, Bactrim, CT contrast)  On 7/20, presented with swelling on tip of nose and fevers (101-102) and treated for nasal vestibulitis with keflex and bactrim. On 7/24, presented with rash on bridge of nose and cheeks and treated for facial cellulitis with doxycycline (10 day course). On 7/28, noticed progression of rash extending down trunk, upper extremities, and thighs. On 7/30, he had two episodes of sudden onset syncope. Rash and syncope accompanied by ongoing subjective fevers, neck pain, and weight loss. Differential includes  drug-induced, rheumatologic, or infectious rash.  Elevated inflammatory markers upon admission (ESR 30, CRP 43, ). Derm and rheum consulted; both suspect drug-induced reaction to antibiotics or IV contrast. Rash improved with triamcinolone ointment BID. Given improvement of rash, ok to discharge with outpatient follow-up  - Dermatology consulted; recs appreciated   - Suspect simple exanthematous drug reaction (vs viral exanthem)   - Arranging allergy consult  - Rheumatology consulted; recs appreciated   - Low suspicion for autoimmune process  - Broad workup pending (HIV, HSV-6, unable to order HSV-7, Parvovirus B19, CMV, EBV, RPR, LAUREN, Anti-ds, Anti-Smith, Jose Elias, Lupus anticoagulant, C3, C4)  - Triamcinolone 0.1% ointment BID   - Antihistamines PRN for ongoing pruritus    Syncope, suspected orthostatic vs vasovagal; resolved  On the morning of 7/30, had two episodes of syncope. First syncope presented as sudden onset of nausea and vertigo (room spinning), after which he found himself face-up on the floor of his kitchen with incontinence and mild confusion. He got up and walked around before having second episode of syncope and found himself face down on the floor of his kitchen. Both episodes were unwitnessed. He did not bite his tongue after either episode; no bumps on his head, blurry vision, or worsening headache. No recent chest pain or palpitations; no history of syncope; no family history of syncope, seizures, or sudden death. Head CT without bleed or ischemia. Telemetry with one day of NSR. Echo normal cardiac function; no pericardial effusion or thickening. Etiology may be orthostatic/vasovagal in setting of poor oral intake.   - s/p IVF     Acute Kidney Injury, suspected prerenal; resolved   Baseline creatinine ~1. ED with Cr 1.5. Normal UA.   - s/p IVF     Ulcerative colitis; not in acute flare  One month of blood diarrhea starting in late May, with colonoscopy on 06/27 which demonstrated UC  proctitis. Started mesalamine 4.8g (), with subsequent good control of UC symptoms. No history of side effects with mesalamine. Given rash and fevers, held mesalamine for one day (); ok with rheum/derm to restart (mesalamine was started outside window of drug-induced reaction).   - Restart  PTA mesalamine    Consultations This Hospital Stay   RHEUMATOLOGY IP CONSULT  DERMATOLOGY IP CONSULT  RHEUMATOLOGY IP CONSULT  DERMATOLOGY IP CONSULT  RHEUMATOLOGY IP CONSULT    Code Status   Full Code       The patient was discussed with Dr. Holder.    Kelsey Parry MD  ______________________________________________________________________    Physical Exam   Vital Signs: Temp: 98.4  F (36.9  C) Temp src: Oral BP: 134/73 Pulse: 67   Resp: 10 SpO2: 100 % O2 Device: None (Room air)    Weight: 209 lbs 14.4 oz  Constitutional: awake, alert, cooperative, no apparent distress  Respiratory: No increased work of breathing, good air exchange  Cardiovascular: RRR, no peripheral edema  GI: Normal bowel sounds, non-distended, non-tender  Skin: Malar rash, rash over shoulders extending to upper extremities, chest, abdomen and back (see media); improving  Musculoskeletal: no lower extremity pitting edema present; no redness, warmth, or swelling of the joints  Neurologic: AAOx3, moves extremities independently    Significant Results and Procedures   Results for orders placed or performed during the hospital encounter of 23   Echo Complete     Value    LVEF  55-60%    Narrative    [id number]  [id number]  [id number]  [id number]^YANET^KELSEY     [facility],[place]  Echocardiography Laboratory  [address] [place] [id number]     Name: LORNA BREWSTER  MRN: [id number]  : 1990  Study Date: 2023 10:38 AM  Age: 33 yrs  Gender: Male  Patient Location: [facility]  Reason For Study: Syncope  Ordering Physician: KELSEY PARRY  Performed By: Tammi Marvin RDCS     BSA: 2.2 m2  Height: 74  in  Weight: 209 lb  BP: 108/71 mmHg  ______________________________________________________________________________  Procedure  Complete Portable Echo Adult.  ______________________________________________________________________________  Interpretation Summary  Left ventricular size, wall motion and function are normal. The ejection  fraction is 55-60%.  Right ventricular function, chamber size, wall motion, and thickness are  normal.  No significant valve abnormalities.  The inferior vena cava is normal.  No pericardial effusion.  There is no prior study for direct comparison.  ______________________________________________________________________________  Left Ventricle  Left ventricular size, wall motion and function are normal. The ejection  fraction is 55-60%. Left ventricular diastolic function is normal.     Right Ventricle  Right ventricular function, chamber size, wall motion, and thickness are  normal.     Atria  Both atria appear normal.     Mitral Valve  The mitral valve is normal.     Aortic Valve  Aortic valve is normal in structure and function.     Tricuspid Valve  The tricuspid valve is normal. The peak velocity of the tricuspid regurgitant  jet is not obtainable. Pulmonary artery systolic pressure cannot be assessed.     Pulmonic Valve  The valve leaflets are not well visualized. On Doppler interrogation, there is  no significant stenosis or regurgitation.     Vessels  The aorta root is normal. The thoracic aorta is normal. The inferior vena cava  is normal.     Pericardium  No pericardial effusion is present.     Compared to Previous Study  There is no prior study for direct comparison.  ______________________________________________________________________________  MMode/2D Measurements & Calculations  IVSd: 0.84 cm  LVIDd: 4.6 cm  LVIDs: 3.0 cm  LVPWd: 0.86 cm  FS: 35.3 %  LV mass(C)d: 126.1 grams  LV mass(C)dI: 56.9 grams/m2  Ao root diam: 2.7 cm  asc Aorta Diam: 2.5 cm  LVOT diam: 2.4  cm  LVOT area: 4.4 cm2  LA Volume (BP): 31.1 ml  LA Volume Index (BP): 14.1 ml/m2     RWT: 0.38  TAPSE: 2.2 cm     Doppler Measurements & Calculations  MV E max carlton: 82.1 cm/sec  MV A max carlton: 48.8 cm/sec  MV E/A: 1.7  MV dec time: 0.17 sec  Ao V2 max: 143.9 cm/sec  Ao max P.3 mmHg  E/E' av.8  Lateral E/e': 4.8  Medial E/e': 6.9  RV S Carlton: 11.7 cm/sec     ______________________________________________________________________________  Report approved by: Oracio Bass 2023 02:04 PM             Pending Results   These results will be followed up by PCP.  Unresulted Labs Ordered in the Past 30 Days of this Admission       Date and Time Order Name Status Description    2023 12:56 PM Human Herpes Type 6 (HHV-6), Quantitative by PCR In process     2023  8:42 AM Anti Nuclear Roberta IgG by IFA with Reflex In process     2023 12:52 AM Bld morphology pathology review In process                Primary Care Physician   NANO TREJO    Discharge Disposition   Discharged to home  Condition at discharge: Stable    Discharge Orders      Primary Care Referral      Follow Up ([facility])    Follow up with primary care provider, NANO TREJO, within 7 days to evaluate improvement in rash and the results of infectious and autoimmune tests.    Appointments on [place] and/or [facility] (with [facility] or [facility] provider or service). Call [phone] if you haven't heard regarding these appointments within 7 days of discharge.     Activity    Your activity upon discharge: activity as tolerated     Reason for your hospital stay    You were admitted with a rash and two episodes of syncope. You were seen by Dermatology and Rheumatology specialists, who believe that your rash is most likely a drug reaction to an antibiotic that you recently were on. We started you on antihistamine and anti-inflammatory ointments, and your rash began to improve. We ordered lab tests to rule out infectious and autoimmune  causes of your rash. Please follow-up with your primary care provider to discuss the results of these tests. We sent a primary care referral to [facility], which provides care on a sliding scale basis. We believe your syncope was caused by dehydration (vasovagal syncope). An EKG showed us that your heart rhythm was normal and an echo showed us that your heart function was normal with no inflammation of the heart tissues; we do no believe your syncope was caused by a heart problem. We took imaging of your head and it was normal; we do not believe that a brain bleed caused your syncope. Please follow-up with your primary care provider to follow-up on your rash and discuss the results of pending lab tests. Please see your primary care provider if you have another episode of syncope. Please go to the ER if hit your head with loss of consciousness or if your rash worsens.     Diet    Follow this diet upon discharge: Orders Placed This Encounter      Combination Diet Regular Diet Adult     Discharge Medications   Discharge Medication List as of 7/31/2023  2:51 PM        START taking these medications    Details   acetaminophen (TYLENOL) 325 MG tablet Take 1 tablet (325 mg) by mouth 4 times daily, Disp-120 tablet, R-0, E-Prescribe      cetirizine (ZYRTEC) 10 MG tablet Take 1 tablet (10 mg) by mouth nightly as needed (pruritis), Disp-30 tablet, R-1, E-Prescribe      diphenhydrAMINE-zinc acetate (BENADRYL) 2-0.1 % external cream Apply topically 3 times daily as needed for itchingDisp-90 g, [id number]      hydrOXYzine (ATARAX) 25 MG tablet Take 1 tablet (25 mg) by mouth every 6 hours as needed for other (adjuvant pain), Disp-120 tablet, R-0, E-Prescribe      triamcinolone (KENALOG) 0.1 % external ointment Apply topically 2 times dailyDisp-15 g, [id number]           CONTINUE these medications which have NOT CHANGED    Details   mesalamine (LIALDA) 1.2 g DR tablet Take 4.8 g by mouth daily,  Historical           STOP taking these medications       doxycycline hyclate (VIBRAMYCIN) 100 MG capsule Comments:   Reason for Stopping:             Allergies   No Known Allergies

## 2023-08-01 ENCOUNTER — TELEPHONE (OUTPATIENT)
Dept: ALLERGY | Facility: CLINIC | Age: 33
End: 2023-08-01
Payer: COMMERCIAL

## 2023-08-01 LAB
ATRIAL RATE - MUSE: 68 BPM
DIASTOLIC BLOOD PRESSURE - MUSE: NORMAL MMHG
INTERPRETATION ECG - MUSE: NORMAL
P AXIS - MUSE: 62 DEGREES
PATH REPORT.COMMENTS IMP SPEC: NORMAL
PATH REPORT.COMMENTS IMP SPEC: NORMAL
PATH REPORT.FINAL DX SPEC: NORMAL
PATH REPORT.MICROSCOPIC SPEC OTHER STN: NORMAL
PATH REPORT.MICROSCOPIC SPEC OTHER STN: NORMAL
PATH REPORT.RELEVANT HX SPEC: NORMAL
PR INTERVAL - MUSE: 136 MS
QRS DURATION - MUSE: 98 MS
QT - MUSE: 392 MS
QTC - MUSE: 416 MS
R AXIS - MUSE: 58 DEGREES
SYSTOLIC BLOOD PRESSURE - MUSE: NORMAL MMHG
T AXIS - MUSE: 40 DEGREES
VENTRICULAR RATE- MUSE: 68 BPM

## 2023-08-01 NOTE — TELEPHONE ENCOUNTER
Via phone patient stated he will call to schedule the following    Appointment type: New  Provider: Dr. Dsouza  Return date: 1st Available  Specialty phone number: [phone]

## 2023-08-02 LAB
ANA PAT SER IF-IMP: ABNORMAL
ANA SER QL IF: POSITIVE
ANA TITR SER IF: ABNORMAL {TITER}
HHV6 DNA # SPEC NAA+PROBE: NOT DETECTED COPIES/ML

## 2024-06-01 ENCOUNTER — HEALTH MAINTENANCE LETTER (OUTPATIENT)
Age: 34
End: 2024-06-01

## 2024-06-14 NOTE — PROGRESS NOTES
"Jose Alejandro Valentine is a 30 year old male who is being evaluated via a billable video visit.      The patient has been notified of following:     \"This video visit will be conducted via a call between you and your physician/provider. We have found that certain health care needs can be provided without the need for an in-person physical exam.  This service lets us provide the care you need with a video conversation.  If a prescription is necessary we can send it directly to your pharmacy.  If lab work is needed we can place an order for that and you can then stop by our lab to have the test done at a later time.    Video visits are billed at different rates depending on your insurance coverage.  Please reach out to your insurance provider with any questions.    If during the course of the call the physician/provider feels a video visit is not appropriate, you will not be charged for this service.\"    Patient has given verbal consent for Video visit? Yes  How would you like to obtain your AVS? MyChart    Will anyone else be joining your video visit? No        Video-Visit Details    Type of service:  Video Visit    Video Start Time: 0703  Video End Time: 7:16 AM    Originating Location (pt. 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  Ulcerative proctitis: Feeling well clinically on lialda monotherapy.  Mucosal healing has not been confirmed.  We will proceed with a fecal calprotectin in addition to routine labs.  If evidence of active inflammation, recommend flex sig.   -- Continue Lialda 4.8g per day  -- Labs CBC, LFTs, CRP, ESR, iron studies, creatinine    2. Anemia, iron deficient. Noted on labs 10/2019. Took PO supp x 4 months. Will " I am not the right  person to see for this.  not a glasses person.  can see chavez again to make sure retina is fine.    then see optometrist for glasses.   repeat labs. If still deficient plan to do IV supp and consider repeat scope for concern of ongoing inflammation.  -- repeat iron studies    3. Elevated creatinine: Noted in July 2019. Plan to repeat with labs now  -- Creatinine recheck.     IBD HISTORY  Age at diagnosis: 27 (feb 2018)  Extent of disease: Proctitis   Current UC medications:   - Lialda 4.8g per day  Prior UC surgeries: None  Prior IBD Medications:  Prednisone x2 (once was IV steroids in hospital)  Canasa - no benefit  Rowasa - no benefit  Hydrocortisone enema     DRUG MONITORING  TPMT enzyme activity:     6-TGN/6-MMPN levels:    Biologic concentration:    DISEASE ASSESSMENT  Labs:  Recent Labs   Lab Test 10/25/19  0907   CRP 3.0   SED 9     Endoscopic assessment: flex sig 4/2019 proctitis.  Enterography: --  Fecal calprotectin: PENDING  C diff: --    sIBDQ:  No flowsheet data found.     IBD Health Care Maintenance:    Vaccinations:  All patients on biologics should avoid live vaccines.    -- Influenza (every year)  -- TdaP (every 10 years)  -- Pneumococcal Pneumonia (once plus booster at 5 years)  -- Yearly assessment for latent Tb (verbal screening and exam, PPD or QuantiFERON-Tb testing)    One time confirmation of immunity or serologies:  -- Hepatitis A (serologies or immunizations)  -- Hepatitis B (serologies or immunizations)  -- Varicella  -- MMR  -- HPV (all aged 18-26)  -- Meningococcal meningitis (all patients at risk for meningitis)    Bone mineral density screening   -- Recommend all patients supplement with calcium and vitamin D    Cancer Screening:  Colon cancer screening:  Recommend colonoscopy in 2026 to assess histologic extent of disease    Skin cancer screening: Annual visual exam of skin by dermatologist since patient is immunocompromised    Depression Screening:  PHQ-2 Score:     PHQ-2 ( 1999 [employer]) 10/25/2019   Q1: Little interest or pleasure in doing things 1   Q2: Feeling down, depressed or hopeless 2   PHQ-2 Score 3   Q1:  Little interest or pleasure in doing things Several days   Q2: Feeling down, depressed or hopeless More than half the days   PHQ-2 Score 3      Misc:  -- Avoid tobacco use  -- Avoid NSAIDs as there is potentially a 25% chance of causing an IBD flare    Return to clinic in 6 months    Thank you for this consultation.  It was a pleasure to participate in the care of this patient; please contact us with any further questions.     This note was created with voice recognition software, and while reviewed for accuracy, typos may remain.       Juan Kang PA-C  Division of Gastroenterology, Hepatology and Nutrition  [facility]     HPI:   Here for follow-up. Feeling well on Lialda monotherapy.  1-2 stools per day, formed. No blood in the stool  No fecal incontinence or nighttime stools. No EIM.    Had evidence of iron deficiency, took PO iron supp x 4 months after last visit.    Lee score:   Stool freq: 0 (baseline stools frequency)  Rectal bleedin (None)  PGA: 0 (normal)  Endoscopy: 0 (normal mucosa)    Remission: <3   Mild disease: 3-5  Moderate disease: 6-10  Severe disease: >10    ROS:    No fevers or chills  No weight loss  No blurry vision, double vision or change in vision  No sore throat  No lymphadenopathy  No headache, paraesthesias, or weakness in a limb  No shortness of breath or wheezing  No chest pain or pressure  No arthralgias or myalgias  No rashes or skin changes  No odynophagia or dysphagia  No BRBPR, hematochezia, melena  No dysuria, frequency or urgency  No hot/cold intolerance or polyria  No anxiety or depression    Extra intestinal manifestations of IBD:  No uveitis/episcleritis  No aphthous ulcers   No arthritis   No erythema nodosum/pyoderma gangrenosum.     PERTINENT PAST MEDICAL HISTORY:  Past Medical History:   Diagnosis Date     Ulcerative proctitis (H)        PREVIOUS SURGERIES:  No past surgical history on file.    PREVIOUS ENDOSCOPY:  Colonoscopy 2018: Normal ileum.  "Normal colon above rectum. \"Moderaly erythematous, friable, ulcerated mucosa in the rectum - 3-4cm from anal verge\"  Path: Moderately active acute and chronic colitis.     Flex si2019: proctitis: \"Congested, erythematous, hemorraghic, inflamed and ulcerated mucosa in the rectum\"  Path: Active chronic colitis consistent with IBD    ALLERGIES:   No Known Allergies    PERTINENT MEDICATIONS:    Current Outpatient Medications:      mesalamine (LIALDA) 1.2 g EC tablet, Take 4 tablets (4.8 g) by mouth daily, Disp: 360 tablet, Rfl: 3     multivitamin w/minerals (MULTI-VITAMIN) tablet, Take 1 tablet by mouth daily, Disp: , Rfl:     SOCIAL HISTORY:  Social History     Socioeconomic History     Marital status:      Spouse name: Not on file     Number of children: Not on file     Years of education: Not on file     Highest education level: Not on file   Occupational History     Not on file   Social Needs     Financial resource strain: Not on file     Food insecurity     Worry: Not on file     Inability: Not on file     Transportation needs     Medical: Not on file     Non-medical: Not on file   Tobacco Use     Smoking status: Former Smoker     Packs/day: 0.00     Smokeless tobacco: Never Used     Tobacco comment: Quit 2020   Substance and Sexual Activity     Alcohol use: Not on file     Drug use: Not on file     Sexual activity: Not on file   Lifestyle     Physical activity     Days per week: Not on file     Minutes per session: Not on file     Stress: Not on file   Relationships     Social connections     Talks on phone: Not on file     Gets together: Not on file     Attends [employer] service: Not on file     Active member of club or organization: Not on file     Attends meetings of clubs or organizations: Not on file     Relationship status: Not on file     Intimate partner violence     Fear of current or ex partner: Not on file     Emotionally abused: Not on file     Physically abused: Not on file     " "Forced sexual activity: Not on file   Other Topics Concern     Parent/sibling w/ CABG, MI or angioplasty before 65F 55M? Not Asked   Social History Narrative     Not on file       FAMILY HISTORY:  No family history on file.    Past/family/social history reviewed and no changes    PHYSICAL EXAMINATION:  Constitutional: aaox3, cooperative, pleasant, not dyspneic/diaphoretic, no acute distress  Vitals reviewed: Ht 1.88 m (6' 2\")   Wt 106.6 kg (235 lb)   BMI 30.17 kg/m    Wt:   Wt Readings from Last 2 Encounters:   08/03/20 106.6 kg (235 lb)   02/04/20 104.1 kg (229 lb 6.4 oz)      Constitutional - general appearance is well and in no acute distress. Body habitus normal  Eyes - No redness or discharge  Respiratory - No cough, unlabored breathing  Musculoskeletal - range of motion intact: Neck and arms  Skin - No discoloration or lesions  Neurological - No tremors, headaches  Psychiatric - No anxiety, alert & oriented    PERTINENT STUDIES:  Most recent CBC:  Recent Labs   Lab Test 10/25/19  0907   WBC 7.1   HGB 12.9*   HCT 43.7        Most recent hepatic panel:  Recent Labs   Lab Test 10/25/19  0907   ALT 30   AST 25     Most recent creatinine:  No lab results found.         "

## 2025-06-14 ENCOUNTER — HEALTH MAINTENANCE LETTER (OUTPATIENT)
Age: 35
End: 2025-06-14